# Patient Record
Sex: MALE | Race: WHITE | NOT HISPANIC OR LATINO | Employment: UNEMPLOYED | URBAN - METROPOLITAN AREA
[De-identification: names, ages, dates, MRNs, and addresses within clinical notes are randomized per-mention and may not be internally consistent; named-entity substitution may affect disease eponyms.]

---

## 2018-03-03 ENCOUNTER — HOSPITAL ENCOUNTER (EMERGENCY)
Facility: OTHER | Age: 33
Discharge: PSYCHIATRIC OR SUBSTANCE USE FACILITY | End: 2018-03-03
Attending: EMERGENCY MEDICINE
Payer: MEDICAID

## 2018-03-03 VITALS
DIASTOLIC BLOOD PRESSURE: 80 MMHG | WEIGHT: 170 LBS | SYSTOLIC BLOOD PRESSURE: 123 MMHG | OXYGEN SATURATION: 98 % | TEMPERATURE: 98 F | HEART RATE: 79 BPM | RESPIRATION RATE: 18 BRPM | BODY MASS INDEX: 25.76 KG/M2 | HEIGHT: 68 IN

## 2018-03-03 DIAGNOSIS — R45.851 SUICIDAL IDEATIONS: Primary | ICD-10-CM

## 2018-03-03 LAB
AMPHET+METHAMPHET UR QL: NEGATIVE
ANION GAP SERPL CALC-SCNC: 10 MMOL/L
APAP SERPL-MCNC: <3 UG/ML
BARBITURATES UR QL SCN>200 NG/ML: NEGATIVE
BASOPHILS # BLD AUTO: 0.01 K/UL
BASOPHILS NFR BLD: 0.2 %
BENZODIAZ UR QL SCN>200 NG/ML: NEGATIVE
BILIRUB UR QL STRIP: NEGATIVE
BUN SERPL-MCNC: 14 MG/DL
BZE UR QL SCN: NEGATIVE
CALCIUM SERPL-MCNC: 9.6 MG/DL
CANNABINOIDS UR QL SCN: NEGATIVE
CHLORIDE SERPL-SCNC: 101 MMOL/L
CLARITY UR: CLEAR
CO2 SERPL-SCNC: 28 MMOL/L
COLOR UR: YELLOW
CREAT SERPL-MCNC: 0.8 MG/DL
CREAT UR-MCNC: 132.4 MG/DL
DIFFERENTIAL METHOD: NORMAL
EOSINOPHIL # BLD AUTO: 0.2 K/UL
EOSINOPHIL NFR BLD: 3.5 %
ERYTHROCYTE [DISTWIDTH] IN BLOOD BY AUTOMATED COUNT: 13.5 %
EST. GFR  (AFRICAN AMERICAN): >60 ML/MIN/1.73 M^2
EST. GFR  (NON AFRICAN AMERICAN): >60 ML/MIN/1.73 M^2
ETHANOL SERPL-MCNC: <10 MG/DL
GLUCOSE SERPL-MCNC: 92 MG/DL
GLUCOSE UR QL STRIP: NEGATIVE
HCT VFR BLD AUTO: 44.6 %
HGB BLD-MCNC: 14.9 G/DL
HGB UR QL STRIP: ABNORMAL
KETONES UR QL STRIP: NEGATIVE
LEUKOCYTE ESTERASE UR QL STRIP: NEGATIVE
LYMPHOCYTES # BLD AUTO: 2 K/UL
LYMPHOCYTES NFR BLD: 32.8 %
MCH RBC QN AUTO: 30.3 PG
MCHC RBC AUTO-ENTMCNC: 33.4 G/DL
MCV RBC AUTO: 91 FL
METHADONE UR QL SCN>300 NG/ML: NEGATIVE
MONOCYTES # BLD AUTO: 0.5 K/UL
MONOCYTES NFR BLD: 7.8 %
NEUTROPHILS # BLD AUTO: 3.4 K/UL
NEUTROPHILS NFR BLD: 55.5 %
NITRITE UR QL STRIP: NEGATIVE
OPIATES UR QL SCN: NEGATIVE
PCP UR QL SCN>25 NG/ML: NEGATIVE
PH UR STRIP: 6 [PH] (ref 5–8)
PLATELET # BLD AUTO: 156 K/UL
PMV BLD AUTO: 10.6 FL
POTASSIUM SERPL-SCNC: 4.3 MMOL/L
PROT UR QL STRIP: NEGATIVE
RBC # BLD AUTO: 4.91 M/UL
SODIUM SERPL-SCNC: 139 MMOL/L
SP GR UR STRIP: 1.02 (ref 1–1.03)
TOXICOLOGY INFORMATION: NORMAL
URN SPEC COLLECT METH UR: ABNORMAL
UROBILINOGEN UR STRIP-ACNC: NEGATIVE EU/DL
WBC # BLD AUTO: 6.06 K/UL

## 2018-03-03 PROCEDURE — 80048 BASIC METABOLIC PNL TOTAL CA: CPT

## 2018-03-03 PROCEDURE — 80307 DRUG TEST PRSMV CHEM ANLYZR: CPT

## 2018-03-03 PROCEDURE — 81003 URINALYSIS AUTO W/O SCOPE: CPT

## 2018-03-03 PROCEDURE — 80320 DRUG SCREEN QUANTALCOHOLS: CPT

## 2018-03-03 PROCEDURE — 99285 EMERGENCY DEPT VISIT HI MDM: CPT

## 2018-03-03 PROCEDURE — S4991 NICOTINE PATCH NONLEGEND: HCPCS | Performed by: EMERGENCY MEDICINE

## 2018-03-03 PROCEDURE — 25000003 PHARM REV CODE 250: Performed by: EMERGENCY MEDICINE

## 2018-03-03 PROCEDURE — 85025 COMPLETE CBC W/AUTO DIFF WBC: CPT

## 2018-03-03 PROCEDURE — 80329 ANALGESICS NON-OPIOID 1 OR 2: CPT

## 2018-03-03 RX ORDER — LORAZEPAM 0.5 MG/1
0.5 TABLET ORAL EVERY 6 HOURS PRN
COMMUNITY
End: 2023-12-11

## 2018-03-03 RX ORDER — LORAZEPAM 1 MG/1
1 TABLET ORAL
Status: COMPLETED | OUTPATIENT
Start: 2018-03-03 | End: 2018-03-03

## 2018-03-03 RX ORDER — IBUPROFEN 200 MG
1 TABLET ORAL DAILY
Status: DISCONTINUED | OUTPATIENT
Start: 2018-03-03 | End: 2018-03-03 | Stop reason: HOSPADM

## 2018-03-03 RX ORDER — PHENYTOIN SODIUM 200 MG/1
200 CAPSULE, EXTENDED RELEASE ORAL 3 TIMES DAILY
COMMUNITY
End: 2024-01-30

## 2018-03-03 RX ADMIN — NICOTINE 1 PATCH: 21 PATCH, EXTENDED RELEASE TRANSDERMAL at 08:03

## 2018-03-03 RX ADMIN — LORAZEPAM 1 MG: 1 TABLET ORAL at 08:03

## 2018-03-03 NOTE — ED TRIAGE NOTES
Pt states he has been feeling depressed lately and last night he started with SI thoughts and wanting to take a bunch of pills. Pt reports prior suicide attempt 4 years ago. Denies any drug use or ETOH. Pt reports compliance with prescribed medications but has not taken any of his daily medications today.

## 2018-03-03 NOTE — ED NOTES
Rounding on the pt performed. art Foy, at bedside with direct observation. Pt calm and cooperative. No c/o pain/discomfort at this time. Safe environment maintained. Bed in low, locked position. Monitoring continues. VSS
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
## 2018-03-03 NOTE — ED NOTES
Pt given blue disposable scrubs to change in to and all personal belongings removed. Nonessential equipment removed from room. Bed in low, locked position. Urinal provided for sample. Danii Julien, at bedside. Direct observation initiated and maintained.

## 2018-03-03 NOTE — ED NOTES
Pt  Talking To dr rosen ate lunch . [place] in view of pt but unable to hear discussion with dr rosen.

## 2018-03-03 NOTE — ED NOTES
Pt belonging  Shoes x 2 , socks x 3 pairs, pants blue  X 2 , 2 bottles body wash, shirts x 3 , tooth brush ,paste hair brush, lighter , hair spray, phone and , lotion, vap device, holter monitor. Insurance card, debt card , [place] [employer] card  , razor , folder  With papers. Security assisted and took  Belongs to lock up.
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
## 2020-09-19 ENCOUNTER — HISTORICAL (OUTPATIENT)
Dept: ADMINISTRATIVE | Facility: HOSPITAL | Age: 35
End: 2020-09-19

## 2020-11-06 ENCOUNTER — HISTORICAL (OUTPATIENT)
Dept: ADMINISTRATIVE | Facility: HOSPITAL | Age: 35
End: 2020-11-06

## 2020-11-09 LAB — FINAL CULTURE: NORMAL

## 2022-12-16 ENCOUNTER — HOSPITAL ENCOUNTER (EMERGENCY)
Facility: HOSPITAL | Age: 37
Discharge: HOME | End: 2022-12-16
Attending: EMERGENCY MEDICINE
Payer: MEDICAID

## 2022-12-16 VITALS
SYSTOLIC BLOOD PRESSURE: 135 MMHG | OXYGEN SATURATION: 98 % | WEIGHT: 155 LBS | DIASTOLIC BLOOD PRESSURE: 85 MMHG | HEART RATE: 101 BPM | TEMPERATURE: 99 F | HEIGHT: 68 IN | BODY MASS INDEX: 23.49 KG/M2 | RESPIRATION RATE: 18 BRPM

## 2022-12-16 DIAGNOSIS — K12.0 APHTHOUS ULCER OF MOUTH: ICD-10-CM

## 2022-12-16 DIAGNOSIS — F43.0 ANXIETY IN ACUTE STRESS REACTION: Primary | ICD-10-CM

## 2022-12-16 DIAGNOSIS — F41.1 ANXIETY IN ACUTE STRESS REACTION: Primary | ICD-10-CM

## 2022-12-16 PROCEDURE — 99283 EMERGENCY DEPT VISIT LOW MDM: CPT

## 2022-12-16 RX ORDER — ALPRAZOLAM 1 MG/1
1 TABLET ORAL 2 TIMES DAILY
Qty: 10 TABLET | Refills: 0 | Status: SHIPPED | OUTPATIENT
Start: 2022-12-16 | End: 2023-12-11

## 2022-12-16 NOTE — ED PROVIDER NOTES
Encounter Date: 12/16/2022       History     Chief Complaint   Patient presents with    Anxiety     Depression and anxiety   started 2 days ago   denies SI HI       37 y.o. male presents to the ED with worsening anxiety and depression over the last several days.  Patient states his partner was diagnosed with colorectal cancer 2 years ago however notes that they recently received some very bad news regarding his condition.  Patient states he is a history of anxiety however stopped taking his Xanax approximately 6 months ago.  States that he is not wanting to eat and drink due to this new news.  Denies auditory visual hallucinations, SI or HI.  States he tried calling his PCP however is not able to see him for another week.      The history is provided by the patient. No  was used.   Anxiety  This is a recurrent problem. The current episode started more than 2 days ago. The problem occurs constantly. The problem has not changed since onset.Pertinent negatives include no chest pain, no abdominal pain, no headaches and no shortness of breath. Nothing aggravates the symptoms. Nothing relieves the symptoms. He has tried nothing for the symptoms.   Review of patient's allergies indicates:   Allergen Reactions    Desyrel [trazodone] Shortness Of Breath    Seroquel [quetiapine] Shortness Of Breath     Past Medical History:   Diagnosis Date    ADHD (attention deficit hyperactivity disorder)     Anxiety     Depression     Fatigue     History of psychiatric care     History of psychiatric hospitalization     about a year ago in [place]    Obsessive-compulsive disorder     Psychiatric exam requested by authority     Psychiatric problem     Psychosis     PTSD (post-traumatic stress disorder)     Seizures     Suicide attempt     2008 by od    Therapy     [facility]     No past surgical history on file.  Family History   Problem Relation Age of Onset    Anxiety disorder Mother     Anxiety disorder  Maternal Aunt      Social History     Tobacco Use    Smoking status: Every Day     Packs/day: 1.50     Years: 10.00     Pack years: 15.00     Types: Cigarettes    Smokeless tobacco: Never   Substance Use Topics    Alcohol use: No    Drug use: No     Review of Systems   Constitutional:  Negative for fever.   HENT:  Positive for mouth sores. Negative for sore throat.    Respiratory:  Negative for shortness of breath.    Cardiovascular:  Negative for chest pain.   Gastrointestinal:  Negative for abdominal pain and nausea.   Genitourinary:  Negative for dysuria.   Musculoskeletal:  Negative for back pain.   Skin:  Negative for rash.   Neurological:  Negative for weakness and headaches.   Hematological:  Does not bruise/bleed easily.   Psychiatric/Behavioral:  Negative for agitation, hallucinations and suicidal ideas. The patient is nervous/anxious.    All other systems reviewed and are negative.    Physical Exam     Initial Vitals [12/16/22 1446]   BP Pulse Resp Temp SpO2   (!) 142/90 110 20 98.6 °F (37 °C) 98 %      MAP       --         Physical Exam    Nursing note and vitals reviewed.  Constitutional: He appears well-developed and well-nourished.   HENT:   Head: Normocephalic and atraumatic.   Mouth/Throat: Uvula is midline, oropharynx is clear and moist and mucous membranes are normal. Oral lesions (consistent with aphthous ulcers) present.   Eyes: EOM are normal. Pupils are equal, round, and reactive to light.   Neck: Neck supple.   Normal range of motion.  Cardiovascular:  Normal rate, regular rhythm, normal heart sounds and intact distal pulses.           Pulmonary/Chest: Breath sounds normal.   Musculoskeletal:         General: Normal range of motion.      Cervical back: Normal range of motion and neck supple.     Neurological: He is alert and oriented to person, place, and time. He has normal strength. GCS score is 15. GCS eye subscore is 4. GCS verbal subscore is 5. GCS motor subscore is 6.   Skin: Skin is  warm and dry.   Psychiatric: His speech is normal and behavior is normal. Judgment and thought content normal. His mood appears anxious. He is not actively hallucinating. Cognition and memory are normal. He exhibits a depressed mood. He expresses no homicidal and no suicidal ideation. He expresses no suicidal plans and no homicidal plans.       ED Course   Procedures  Labs Reviewed - No data to display       Imaging Results    None          Medications - No data to display  Medical Decision Making:   Differential Diagnosis:   Anxiety, depression, panic attack, aphthous ulcer  ED Management:  Patient with previous history of anxiety, on Xanax, however states he took himself off approximately 6 months ago.  States that his partners condition has begun to worsen as of late and believes this is causing spikes in his anxiety.  Patient states he is on Paxil for OCD notes that his anxiety is getting the best of him.  States he is also depressed however denies any SI, HI, AH or VH.  Patient states he just needs a little help.  States he tried getting into see his PCP however is not able to until next week.  Will send home with short course of Xanax to take as needed for symptom relief.  So gave him instructions to buy over-the-counter anesthetic for canker sores.                        Clinical Impression:   Final diagnoses:  [F41.1, F43.0] Anxiety in acute stress reaction (Primary)  [K12.0] Aphthous ulcer of mouth        ED Disposition Condition    Discharge Stable          ED Prescriptions       Medication Sig Dispense Start Date End Date Auth. Provider    ALPRAZolam (XANAX) 1 MG tablet Take 1 tablet (1 mg total) by mouth 2 (two) times daily. for 5 days 10 tablet 12/16/2022 12/21/2022 Lobito Jon PA-C          Follow-up Information       Follow up With Specialties Details Why Contact Info    [facility] - Emergency Dept Emergency Medicine In 1 week If symptoms worsen [id number]  [place] [address]  [phone]    Primary care provider  In 2 days As needed              Lobito Jon PA-C  12/16/22 5222

## 2023-10-02 DIAGNOSIS — B20 HUMAN IMMUNODEFICIENCY VIRUS (HIV) DISEASE: Primary | ICD-10-CM

## 2023-10-13 ENCOUNTER — TELEPHONE (OUTPATIENT)
Dept: INFECTIOUS DISEASES | Facility: CLINIC | Age: 38
End: 2023-10-13
Payer: MEDICAID

## 2023-10-13 NOTE — TELEPHONE ENCOUNTER
Not sure what call he is referring to. I spoke with him in entirety yesterday. He has an appointment today with me that I scheduled yesterday, which as of now he is a no show.

## 2023-10-13 NOTE — TELEPHONE ENCOUNTER
----- Message from Elyse Chapa sent at 10/13/2023 10:34 AM CDT -----  Nasrin Hua left vm returning your call     [phone]

## 2023-10-27 ENCOUNTER — CLINICAL SUPPORT (OUTPATIENT)
Dept: INFECTIOUS DISEASES | Facility: CLINIC | Age: 38
End: 2023-10-27
Payer: MEDICAID

## 2023-10-27 ENCOUNTER — TELEPHONE (OUTPATIENT)
Dept: INFECTIOUS DISEASES | Facility: CLINIC | Age: 38
End: 2023-10-27
Payer: MEDICAID

## 2023-10-27 VITALS — BODY MASS INDEX: 19.45 KG/M2 | WEIGHT: 127.88 LBS

## 2023-10-27 DIAGNOSIS — B20 HUMAN IMMUNODEFICIENCY VIRUS (HIV) DISEASE: Primary | ICD-10-CM

## 2023-10-27 LAB
ALBUMIN SERPL-MCNC: 4.3 G/DL (ref 3.5–5)
ALBUMIN/GLOB SERPL: 1.3 RATIO (ref 1.1–2)
ALP SERPL-CCNC: 60 UNIT/L (ref 40–150)
ALT SERPL-CCNC: 22 UNIT/L (ref 0–55)
AST SERPL-CCNC: 19 UNIT/L (ref 5–34)
BASOPHILS # BLD AUTO: 0.03 X10(3)/MCL
BASOPHILS NFR BLD AUTO: 0.6 %
BILIRUB SERPL-MCNC: 0.2 MG/DL
BUN SERPL-MCNC: 13.2 MG/DL (ref 8.9–20.6)
CALCIUM SERPL-MCNC: 9.9 MG/DL (ref 8.4–10.2)
CHLORIDE SERPL-SCNC: 104 MMOL/L (ref 98–107)
CHOLEST SERPL-MCNC: 129 MG/DL
CHOLEST/HDLC SERPL: 4 {RATIO} (ref 0–5)
CO2 SERPL-SCNC: 28 MMOL/L (ref 22–29)
CREAT SERPL-MCNC: 0.86 MG/DL (ref 0.73–1.18)
DEPRECATED CALCIDIOL+CALCIFEROL SERPL-MC: 27.6 NG/ML (ref 30–80)
EOSINOPHIL # BLD AUTO: 0.35 X10(3)/MCL (ref 0–0.9)
EOSINOPHIL NFR BLD AUTO: 6.9 %
ERYTHROCYTE [DISTWIDTH] IN BLOOD BY AUTOMATED COUNT: 13.6 % (ref 11.5–17)
EST. AVERAGE GLUCOSE BLD GHB EST-MCNC: 96.8 MG/DL
GFR SERPLBLD CREATININE-BSD FMLA CKD-EPI: >60 MLS/MIN/1.73/M2
GLOBULIN SER-MCNC: 3.2 GM/DL (ref 2.4–3.5)
GLUCOSE SERPL-MCNC: 61 MG/DL (ref 74–100)
HAV AB SER QL IA: REACTIVE
HBA1C MFR BLD: 5 %
HBV CORE IGM SERPL QL IA: NONREACTIVE
HBV SURFACE AB SER-ACNC: 150.69 MIU/ML
HBV SURFACE AB SERPL IA-ACNC: REACTIVE M[IU]/ML
HCT VFR BLD AUTO: 45.4 % (ref 42–52)
HCV AB SERPL QL IA: NONREACTIVE
HDLC SERPL-MCNC: 34 MG/DL (ref 35–60)
HGB BLD-MCNC: 14.8 G/DL (ref 14–18)
IMM GRANULOCYTES # BLD AUTO: 0.01 X10(3)/MCL (ref 0–0.04)
IMM GRANULOCYTES NFR BLD AUTO: 0.2 %
LDLC SERPL CALC-MCNC: 55 MG/DL (ref 50–140)
LYMPHOCYTES # BLD AUTO: 1.92 X10(3)/MCL (ref 0.6–4.6)
LYMPHOCYTES NFR BLD AUTO: 37.7 %
MCH RBC QN AUTO: 28 PG (ref 27–31)
MCHC RBC AUTO-ENTMCNC: 32.6 G/DL (ref 33–36)
MCV RBC AUTO: 86 FL (ref 80–94)
MONOCYTES # BLD AUTO: 0.38 X10(3)/MCL (ref 0.1–1.3)
MONOCYTES NFR BLD AUTO: 7.5 %
NEUTROPHILS # BLD AUTO: 2.4 X10(3)/MCL (ref 2.1–9.2)
NEUTROPHILS NFR BLD AUTO: 47.1 %
NRBC BLD AUTO-RTO: 0 %
PLATELET # BLD AUTO: 181 X10(3)/MCL (ref 130–400)
PMV BLD AUTO: 10.4 FL (ref 7.4–10.4)
POTASSIUM SERPL-SCNC: 4 MMOL/L (ref 3.5–5.1)
PROT SERPL-MCNC: 7.5 GM/DL (ref 6.4–8.3)
PSA SERPL-MCNC: 0.57 NG/ML
RBC # BLD AUTO: 5.28 X10(6)/MCL (ref 4.7–6.1)
SODIUM SERPL-SCNC: 140 MMOL/L (ref 136–145)
T PALLIDUM AB SER QL: NONREACTIVE
TRIGL SERPL-MCNC: 200 MG/DL (ref 34–140)
TSH SERPL-ACNC: 1.07 UIU/ML (ref 0.35–4.94)
VLDLC SERPL CALC-MCNC: 40 MG/DL
WBC # SPEC AUTO: 5.09 X10(3)/MCL (ref 4.5–11.5)

## 2023-10-27 PROCEDURE — 86780 TREPONEMA PALLIDUM: CPT

## 2023-10-27 PROCEDURE — 82306 VITAMIN D 25 HYDROXY: CPT

## 2023-10-27 PROCEDURE — 86706 HEP B SURFACE ANTIBODY: CPT

## 2023-10-27 PROCEDURE — 86480 TB TEST CELL IMMUN MEASURE: CPT

## 2023-10-27 PROCEDURE — 84153 ASSAY OF PSA TOTAL: CPT

## 2023-10-27 PROCEDURE — 86803 HEPATITIS C AB TEST: CPT

## 2023-10-27 PROCEDURE — 80061 LIPID PANEL: CPT

## 2023-10-27 PROCEDURE — 87536 HIV-1 QUANT&REVRSE TRNSCRPJ: CPT

## 2023-10-27 PROCEDURE — 0219U NFCT AGT HIV GNRJ SEQ ALYS: CPT

## 2023-10-27 PROCEDURE — 84443 ASSAY THYROID STIM HORMONE: CPT

## 2023-10-27 PROCEDURE — 80053 COMPREHEN METABOLIC PANEL: CPT

## 2023-10-27 PROCEDURE — 86709 HEPATITIS A IGM ANTIBODY: CPT

## 2023-10-27 PROCEDURE — 85025 COMPLETE CBC W/AUTO DIFF WBC: CPT

## 2023-10-27 PROCEDURE — 86708 HEPATITIS A ANTIBODY: CPT

## 2023-10-27 PROCEDURE — 81381 HLA I TYPING 1 ALLELE HR: CPT

## 2023-10-27 PROCEDURE — 83036 HEMOGLOBIN GLYCOSYLATED A1C: CPT

## 2023-10-27 PROCEDURE — 36415 COLL VENOUS BLD VENIPUNCTURE: CPT
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
## 2023-10-31 LAB
ADVERSE DRUG SEQ VAR INTERP BLD/T-IMP: NORMAL
GAMMA INTERFERON BACKGROUND BLD IA-ACNC: 0.05 IU/ML
GENETICIST REVIEW: NORMAL
HLA-B*57:01 QL: NEGATIVE
LAB TEST METHOD: NORMAL
M TB IFN-G BLD-IMP: NEGATIVE
M TB IFN-G CD4+ BCKGRND COR BLD-ACNC: -0.01 IU/ML
M TB IFN-G CD4+CD8+ BCKGRND COR BLD-ACNC: -0.01 IU/ML
MITOGEN IGNF BCKGRD COR BLD-ACNC: 9.46 IU/ML
PROVIDER SIGNING NAME: NORMAL
TEST PERFORMANCE INFO SPEC: NORMAL

## 2023-11-01 LAB — HIV1 RNA # PLAS NAA DL=20: ABNORMAL COPIES/ML

## 2023-11-02 LAB
ABACAVIR ISLT GENOTYP: NORMAL
ATAZANAVIR+RITONAVIR ISLT GENOTYP: NORMAL
BICTEGRAVIR ISLT GENOTYP: NORMAL
CABOTEGRAVIR ISLT GENOTYP: NORMAL
DARUNAVIR+RITONAVIR ISLT GENOTYP: NORMAL
DIDANOSINE ISLT GENOTYP: NORMAL
DOLUTEGRAVIR ISLT GENOTYP: NORMAL
DORAVIRINE ISLT GENOTYP: NORMAL
EFAVIRENZ ISLT GENOTYP: NORMAL
ELVITEGRAVIR ISLT GENOTYP: NORMAL
EMTRICITABINE ISLT GENOTYP: NORMAL
ETRAVIRINE ISLT GENOTYP: NORMAL
FOSAMPRENAVIR+RITONAVIR ISLT GENOTYP: NORMAL
HIV 1 RNA GENOTYPE ISLT: NORMAL
HIV 1 RNA RT + PR + IN MUT DET PLAS SEQ: NORMAL
HIV NNRTI GENE MUT DET ISLT: NORMAL
HIV NRTI GENE MUT DET ISLT: NORMAL
HIV PI GENE MUT DET ISLT: NORMAL
HIV1 INTEGRASE GENE MUT DET ISLT: NORMAL
INDINAVIR+RITONAVIR ISLT GENOTYP: NORMAL
LAB AOE PNL PATIENT: NORMAL
LAMIVUDINE ISLT GENOTYP: NORMAL
LOPINAVIR+RITONAVIR ISLT GENOTYP: NORMAL
M INTEGRASE FAILED CODONS: NORMAL
M PROTEASE FAILED CODONS: NORMAL
M REVERSE TRANSCRIPTASE FAILED CODONS: NORMAL
NELFINAVIR ISLT GENOTYP: NORMAL
NEVIRAPINE ISLT GENOTYP: NORMAL
RALTEGRAVIR ISLT GENOTYP: NORMAL
RILPIVIRINE ISLT GENOTYP: NORMAL
SAQUINAVIR+RITONAVIR ISLT GENOTYP: NORMAL
STAVUDINE ISLT GENOTYP: NORMAL
TENOFOVIR ISLT GENOTYP: NORMAL
TIPRANAVIR+RITONAVIR ISLT GENOTYP: NORMAL
ZIDOVUDINE ISLT GENOTYP: NORMAL

## 2023-11-16 ENCOUNTER — TELEPHONE (OUTPATIENT)
Dept: INFECTIOUS DISEASES | Facility: CLINIC | Age: 38
End: 2023-11-16
Payer: MEDICAID

## 2023-11-16 NOTE — TELEPHONE ENCOUNTER
----- Message from Angelina Edgar sent at 11/16/2023  2:48 PM CST -----  Regarding: Results  Tammy medrano pt       Pt is requesting a call for viral load results.   Please call @  [phone]

## 2023-12-11 ENCOUNTER — HOSPITAL ENCOUNTER (OUTPATIENT)
Dept: RADIOLOGY | Facility: HOSPITAL | Age: 38
Discharge: HOME | End: 2023-12-11
Attending: NURSE PRACTITIONER
Payer: MEDICAID

## 2023-12-11 ENCOUNTER — OFFICE VISIT (OUTPATIENT)
Dept: INFECTIOUS DISEASES | Facility: CLINIC | Age: 38
End: 2023-12-11
Payer: MEDICAID

## 2023-12-11 VITALS
DIASTOLIC BLOOD PRESSURE: 86 MMHG | RESPIRATION RATE: 16 BRPM | WEIGHT: 129.13 LBS | HEART RATE: 102 BPM | BODY MASS INDEX: 19.57 KG/M2 | TEMPERATURE: 98 F | HEIGHT: 68 IN | SYSTOLIC BLOOD PRESSURE: 135 MMHG

## 2023-12-11 DIAGNOSIS — F32.A DEPRESSION, UNSPECIFIED DEPRESSION TYPE: ICD-10-CM

## 2023-12-11 DIAGNOSIS — B20 HIV DISEASE: Primary | ICD-10-CM

## 2023-12-11 DIAGNOSIS — G40.909 NONINTRACTABLE EPILEPSY WITHOUT STATUS EPILEPTICUS, UNSPECIFIED EPILEPSY TYPE: ICD-10-CM

## 2023-12-11 DIAGNOSIS — B20 HIV DISEASE: ICD-10-CM

## 2023-12-11 DIAGNOSIS — F41.1 GAD (GENERALIZED ANXIETY DISORDER): ICD-10-CM

## 2023-12-11 DIAGNOSIS — F17.210 CIGARETTE NICOTINE DEPENDENCE WITHOUT COMPLICATION: ICD-10-CM

## 2023-12-11 LAB
CRYPTOC AG SER QL IA.RAPID: NEGATIVE
CRYPTOC AG TITR CSF IA: NORMAL {TITER}

## 2023-12-11 PROCEDURE — 86644 CMV ANTIBODY: CPT | Performed by: NURSE PRACTITIONER

## 2023-12-11 PROCEDURE — 99215 OFFICE O/P EST HI 40 MIN: CPT | Mod: PBBFAC,25 | Performed by: NURSE PRACTITIONER

## 2023-12-11 PROCEDURE — 3044F HG A1C LEVEL LT 7.0%: CPT | Mod: CPTII,,, | Performed by: NURSE PRACTITIONER

## 2023-12-11 PROCEDURE — 99205 OFFICE O/P NEW HI 60 MIN: CPT | Mod: 25,S$PBB,, | Performed by: NURSE PRACTITIONER

## 2023-12-11 PROCEDURE — 3079F PR MOST RECENT DIASTOLIC BLOOD PRESSURE 80-89 MM HG: ICD-10-PCS | Mod: CPTII,,, | Performed by: NURSE PRACTITIONER

## 2023-12-11 PROCEDURE — 71046 X-RAY EXAM CHEST 2 VIEWS: CPT | Mod: TC

## 2023-12-11 PROCEDURE — 36415 COLL VENOUS BLD VENIPUNCTURE: CPT | Performed by: NURSE PRACTITIONER

## 2023-12-11 PROCEDURE — 3008F BODY MASS INDEX DOCD: CPT | Mod: CPTII,,, | Performed by: NURSE PRACTITIONER

## 2023-12-11 PROCEDURE — 1160F PR REVIEW ALL MEDS BY PRESCRIBER/CLIN PHARMACIST DOCUMENTED: ICD-10-PCS | Mod: CPTII,,, | Performed by: NURSE PRACTITIONER

## 2023-12-11 PROCEDURE — 99406 PR TOBACCO USE CESSATION INTERMEDIATE 3-10 MINUTES: ICD-10-PCS | Mod: S$PBB,,, | Performed by: NURSE PRACTITIONER

## 2023-12-11 PROCEDURE — 1159F PR MEDICATION LIST DOCUMENTED IN MEDICAL RECORD: ICD-10-PCS | Mod: CPTII,,, | Performed by: NURSE PRACTITIONER

## 2023-12-11 PROCEDURE — 3044F PR MOST RECENT HEMOGLOBIN A1C LEVEL <7.0%: ICD-10-PCS | Mod: CPTII,,, | Performed by: NURSE PRACTITIONER

## 2023-12-11 PROCEDURE — 86361 T CELL ABSOLUTE COUNT: CPT | Performed by: NURSE PRACTITIONER

## 2023-12-11 PROCEDURE — 3075F PR MOST RECENT SYSTOLIC BLOOD PRESS GE 130-139MM HG: ICD-10-PCS | Mod: CPTII,,, | Performed by: NURSE PRACTITIONER

## 2023-12-11 PROCEDURE — 86778 TOXOPLASMA ANTIBODY IGM: CPT | Performed by: NURSE PRACTITIONER

## 2023-12-11 PROCEDURE — 87899 AGENT NOS ASSAY W/OPTIC: CPT | Performed by: NURSE PRACTITIONER

## 2023-12-11 PROCEDURE — 3075F SYST BP GE 130 - 139MM HG: CPT | Mod: CPTII,,, | Performed by: NURSE PRACTITIONER

## 2023-12-11 PROCEDURE — 1160F RVW MEDS BY RX/DR IN RCRD: CPT | Mod: CPTII,,, | Performed by: NURSE PRACTITIONER

## 2023-12-11 PROCEDURE — 99205 PR OFFICE/OUTPT VISIT, NEW, LEVL V, 60-74 MIN: ICD-10-PCS | Mod: 25,S$PBB,, | Performed by: NURSE PRACTITIONER

## 2023-12-11 PROCEDURE — 3079F DIAST BP 80-89 MM HG: CPT | Mod: CPTII,,, | Performed by: NURSE PRACTITIONER

## 2023-12-11 PROCEDURE — 3008F PR BODY MASS INDEX (BMI) DOCUMENTED: ICD-10-PCS | Mod: CPTII,,, | Performed by: NURSE PRACTITIONER

## 2023-12-11 PROCEDURE — 1159F MED LIST DOCD IN RCRD: CPT | Mod: CPTII,,, | Performed by: NURSE PRACTITIONER

## 2023-12-11 PROCEDURE — 86777 TOXOPLASMA ANTIBODY: CPT | Performed by: NURSE PRACTITIONER

## 2023-12-11 PROCEDURE — 99406 BEHAV CHNG SMOKING 3-10 MIN: CPT | Mod: S$PBB,,, | Performed by: NURSE PRACTITIONER

## 2023-12-11 RX ORDER — CYPROHEPTADINE HYDROCHLORIDE 4 MG/1
TABLET ORAL 2 TIMES DAILY
COMMUNITY

## 2023-12-11 RX ORDER — IBUPROFEN 800 MG/1
TABLET ORAL
COMMUNITY
Start: 2023-10-23

## 2023-12-11 RX ORDER — PHENYTOIN SODIUM 200 MG/1
200 CAPSULE, EXTENDED RELEASE ORAL 2 TIMES DAILY
COMMUNITY
End: 2024-01-30

## 2023-12-11 RX ORDER — PROPRANOLOL HYDROCHLORIDE 40 MG/1
TABLET ORAL
COMMUNITY
End: 2024-03-27

## 2023-12-11 RX ORDER — ARIPIPRAZOLE 20 MG/1
20 TABLET ORAL
COMMUNITY
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
## 2023-12-12 LAB
CMV IGG SERPL QL IA: POSITIVE
T GONDII IGG SER QL IA: NEGATIVE
T GONDII IGG SER-ACNC: <3 IU/ML
T GONDII IGM SERPL QL IA: NEGATIVE

## 2023-12-13 LAB
AGE: 36
CD3+CD4+ CELLS # SPEC: 636 UNIT/L (ref 589–1505)
CD3+CD4+ CELLS NFR BLD: 50 %
LYMPHOCYTES # BLD AUTO: 1272 X10(3)/MCL (ref 1260–5520)
LYMPHOCYTES NFR LN MANUAL: 16 % (ref 28–48)
LYMPHOMA - T-CELL MARKERS SPEC-IMP: ABNORMAL
WBC # BLD AUTO: 7950 /MM3 (ref 4500–11500)

## 2024-01-30 PROBLEM — F17.200 SMOKER: Status: ACTIVE | Noted: 2018-03-28

## 2024-02-21 ENCOUNTER — DOCUMENTATION ONLY (OUTPATIENT)
Dept: INFECTIOUS DISEASES | Facility: CLINIC | Age: 39
End: 2024-02-21
Payer: MEDICAID

## 2024-03-27 ENCOUNTER — HOSPITAL ENCOUNTER (EMERGENCY)
Facility: HOSPITAL | Age: 39
Discharge: HOME | End: 2024-03-27
Attending: EMERGENCY MEDICINE
Payer: MEDICAID

## 2024-03-27 VITALS
OXYGEN SATURATION: 100 % | HEIGHT: 68 IN | RESPIRATION RATE: 18 BRPM | WEIGHT: 140 LBS | SYSTOLIC BLOOD PRESSURE: 135 MMHG | DIASTOLIC BLOOD PRESSURE: 86 MMHG | HEART RATE: 86 BPM | BODY MASS INDEX: 21.22 KG/M2 | TEMPERATURE: 98 F

## 2024-03-27 DIAGNOSIS — F32.A DEPRESSION, UNSPECIFIED DEPRESSION TYPE: Primary | ICD-10-CM

## 2024-03-27 LAB
ALBUMIN SERPL-MCNC: 4.3 G/DL (ref 3.5–5)
ALBUMIN/GLOB SERPL: 1.4 RATIO (ref 1.1–2)
ALP SERPL-CCNC: 64 UNIT/L (ref 40–150)
ALT SERPL-CCNC: 16 UNIT/L (ref 0–55)
APPEARANCE UR: CLEAR
AST SERPL-CCNC: 20 UNIT/L (ref 5–34)
BACTERIA #/AREA URNS AUTO: ABNORMAL /HPF
BASOPHILS # BLD AUTO: 0.02 X10(3)/MCL
BASOPHILS NFR BLD AUTO: 0.2 %
BILIRUB SERPL-MCNC: 0.2 MG/DL
BILIRUB UR QL STRIP.AUTO: NEGATIVE
BUN SERPL-MCNC: 13.4 MG/DL (ref 8.9–20.6)
CALCIUM SERPL-MCNC: 9.6 MG/DL (ref 8.4–10.2)
CARBAMAZEPINE FREE SERPL-MCNC: <1.9 UG/ML (ref 4–12)
CHLORIDE SERPL-SCNC: 106 MMOL/L (ref 98–107)
CO2 SERPL-SCNC: 23 MMOL/L (ref 22–29)
COLOR UR AUTO: ABNORMAL
CREAT SERPL-MCNC: 0.85 MG/DL (ref 0.73–1.18)
EOSINOPHIL # BLD AUTO: 0.28 X10(3)/MCL (ref 0–0.9)
EOSINOPHIL NFR BLD AUTO: 3.3 %
ERYTHROCYTE [DISTWIDTH] IN BLOOD BY AUTOMATED COUNT: 13 % (ref 11.5–17)
ETHANOL SERPL-MCNC: <10 MG/DL
GFR SERPLBLD CREATININE-BSD FMLA CKD-EPI: >60 MLS/MIN/1.73/M2
GLOBULIN SER-MCNC: 3.1 GM/DL (ref 2.4–3.5)
GLUCOSE SERPL-MCNC: 94 MG/DL (ref 74–100)
GLUCOSE UR QL STRIP.AUTO: NORMAL
HCT VFR BLD AUTO: 41.1 % (ref 42–52)
HGB BLD-MCNC: 13.7 G/DL (ref 14–18)
IMM GRANULOCYTES # BLD AUTO: 0.01 X10(3)/MCL (ref 0–0.04)
IMM GRANULOCYTES NFR BLD AUTO: 0.1 %
KETONES UR QL STRIP.AUTO: NEGATIVE
LEUKOCYTE ESTERASE UR QL STRIP.AUTO: NEGATIVE
LYMPHOCYTES # BLD AUTO: 1.39 X10(3)/MCL (ref 0.6–4.6)
LYMPHOCYTES NFR BLD AUTO: 16.2 %
MCH RBC QN AUTO: 27.8 PG (ref 27–31)
MCHC RBC AUTO-ENTMCNC: 33.3 G/DL (ref 33–36)
MCV RBC AUTO: 83.5 FL (ref 80–94)
MONOCYTES # BLD AUTO: 0.49 X10(3)/MCL (ref 0.1–1.3)
MONOCYTES NFR BLD AUTO: 5.7 %
MUCOUS THREADS URNS QL MICRO: ABNORMAL /LPF
NEUTROPHILS # BLD AUTO: 6.4 X10(3)/MCL (ref 2.1–9.2)
NEUTROPHILS NFR BLD AUTO: 74.5 %
NITRITE UR QL STRIP.AUTO: NEGATIVE
NRBC BLD AUTO-RTO: 0 %
PH UR STRIP.AUTO: 6 [PH]
PHENYTOIN SERPL-MCNC: <1.8 UG/ML (ref 10–20)
PLATELET # BLD AUTO: 172 X10(3)/MCL (ref 130–400)
PMV BLD AUTO: 9.9 FL (ref 7.4–10.4)
POTASSIUM SERPL-SCNC: 4.1 MMOL/L (ref 3.5–5.1)
PROT SERPL-MCNC: 7.4 GM/DL (ref 6.4–8.3)
PROT UR QL STRIP.AUTO: NEGATIVE
RBC # BLD AUTO: 4.92 X10(6)/MCL (ref 4.7–6.1)
RBC #/AREA URNS AUTO: ABNORMAL /HPF
RBC UR QL AUTO: ABNORMAL
SODIUM SERPL-SCNC: 138 MMOL/L (ref 136–145)
SP GR UR STRIP.AUTO: 1.02 (ref 1–1.03)
SQUAMOUS #/AREA URNS LPF: ABNORMAL /HPF
TSH SERPL-ACNC: 0.66 UIU/ML (ref 0.35–4.94)
UROBILINOGEN UR STRIP-ACNC: NORMAL
WBC # SPEC AUTO: 8.59 X10(3)/MCL (ref 4.5–11.5)
WBC #/AREA URNS AUTO: ABNORMAL /HPF

## 2024-03-27 PROCEDURE — 82077 ASSAY SPEC XCP UR&BREATH IA: CPT | Performed by: EMERGENCY MEDICINE

## 2024-03-27 PROCEDURE — 80161 ASY CARBAMAZEPIN 10,11-EPXID: CPT | Performed by: EMERGENCY MEDICINE

## 2024-03-27 PROCEDURE — 80185 ASSAY OF PHENYTOIN TOTAL: CPT | Performed by: EMERGENCY MEDICINE

## 2024-03-27 PROCEDURE — 81001 URINALYSIS AUTO W/SCOPE: CPT | Mod: XB | Performed by: EMERGENCY MEDICINE

## 2024-03-27 PROCEDURE — 99284 EMERGENCY DEPT VISIT MOD MDM: CPT

## 2024-03-27 PROCEDURE — 80053 COMPREHEN METABOLIC PANEL: CPT | Performed by: EMERGENCY MEDICINE

## 2024-03-27 PROCEDURE — 84443 ASSAY THYROID STIM HORMONE: CPT | Performed by: EMERGENCY MEDICINE

## 2024-03-27 PROCEDURE — 80307 DRUG TEST PRSMV CHEM ANLYZR: CPT | Performed by: EMERGENCY MEDICINE

## 2024-03-27 PROCEDURE — 85025 COMPLETE CBC W/AUTO DIFF WBC: CPT | Performed by: EMERGENCY MEDICINE

## 2024-03-27 RX ORDER — PAROXETINE HYDROCHLORIDE 20 MG/1
20 TABLET, FILM COATED ORAL NIGHTLY
Qty: 30 TABLET | Refills: 0 | Status: SHIPPED | OUTPATIENT
Start: 2024-03-27 | End: 2025-03-27

## 2024-03-27 RX ORDER — PAROXETINE 30 MG/1
30 TABLET, FILM COATED ORAL DAILY
Qty: 30 TABLET | Refills: 0 | Status: SHIPPED | OUTPATIENT
Start: 2024-03-27 | End: 2024-04-26

## 2024-03-27 RX ORDER — LEVETIRACETAM 500 MG/1
500 TABLET ORAL DAILY
Qty: 30 TABLET | Refills: 0 | Status: SHIPPED | OUTPATIENT
Start: 2024-03-27 | End: 2025-03-27

## 2024-03-27 RX ORDER — PROPRANOLOL HYDROCHLORIDE 40 MG/1
40 TABLET ORAL 2 TIMES DAILY
Qty: 60 TABLET | Refills: 0 | Status: SHIPPED | OUTPATIENT
Start: 2024-03-27 | End: 2024-05-31

## 2024-03-28 LAB
AMPHET UR QL SCN: POSITIVE
BARBITURATE SCN PRESENT UR: NEGATIVE
BENZODIAZ UR QL SCN: POSITIVE
CANNABINOIDS UR QL SCN: NEGATIVE
COCAINE UR QL SCN: NEGATIVE
FENTANYL UR QL SCN: NEGATIVE
MDMA UR QL SCN: NEGATIVE
OPIATES UR QL SCN: NEGATIVE
PCP UR QL: NEGATIVE
PH UR: 6 [PH] (ref 3–11)
SPECIFIC GRAVITY, URINE AUTO (.000) (OHS): 1.02 (ref 1–1.03)

## 2024-03-28 NOTE — ED PROVIDER NOTES
"Encounter Date: 3/27/2024    SCRIBE #1 NOTE: I, Gabriela Vu, am scribing for, and in the presence of,  Graham Cotton III, MD. I have scribed the following portions of the note - Other sections scribed: HPI, ROS, PE.       History     Chief Complaint   Patient presents with    Depression     Pt arrives via [facility], EMS reports pt called due to SI, however on arrival pt reports that he is depressed and anxious out of depression medication. Pt reports that he can't get in to see his PCP. Pt denies SI, HI, or hallucinations. Pt states that he " has been down this road before and know where it leads". He is concerned about his condition getting worse without medication.      38 year old male with history of anxiety, depression, PTSD, psychiatric hospitalizations, and seizures presents to the ED via EMS for depression. Pt reports that he was at [employer] and called EMS because he is depressed. He states that he is not suicidal but is worried "it's getting to that point" because his mother passed away 3 months ago and he just ran out of his psych medications. He notes that he called his psychiatrist today and he could not get an appointment until 2 weeks from now. Pt states that he has attempted suicide in the past when his father passed away. He denies AVH.     The history is provided by the patient. No  was used.     Review of patient's allergies indicates:   Allergen Reactions    Desyrel [trazodone] Shortness Of Breath    Haloperidol lactate Anaphylaxis and Other (See Comments)    Seroquel [quetiapine] Shortness Of Breath    Ziprasidone      Past Medical History:   Diagnosis Date    ADHD (attention deficit hyperactivity disorder)     Anxiety     Depression     Fatigue     History of psychiatric care     History of psychiatric hospitalization     about a year ago in [place]    Obsessive-compulsive disorder     Psychiatric exam requested by authority     Psychiatric problem     Psychosis     " PTSD (post-traumatic stress disorder)     Seizures     Suicide attempt     2008 by [facility]    Therapy     [facility]     Past Surgical History:   Procedure Laterality Date    hemorrhoidectomy  2013     Family History   Problem Relation Age of Onset    Anxiety disorder Mother     Seizures Mother     Heart attack Mother     Cancer Father     Hyperlipidemia Father     Hypertension Father     Diabetes Father     Bipolar disorder Father     Hypertension Sister     Anxiety disorder Maternal Aunt      Social History     Tobacco Use    Smoking status: Every Day     Current packs/day: 1.50     Average packs/day: 1.5 packs/day for 10.0 years (15.0 ttl pk-yrs)     Types: Cigarettes    Smokeless tobacco: Never   Substance Use Topics    Alcohol use: No    Drug use: No     Review of Systems   Psychiatric/Behavioral:  Negative for hallucinations and suicidal ideas.         Depressed       Physical Exam     Initial Vitals [03/27/24 2200]   BP Pulse Resp Temp SpO2   138/84 81 18 98.1 °F (36.7 °C) 98 %      MAP       --         Physical Exam    Nursing note and vitals reviewed.  Constitutional: No distress.   HENT:   Head: Normocephalic and atraumatic.   Neck: Trachea normal.   Cardiovascular:  Normal rate and regular rhythm.           No murmur heard.  Pulmonary/Chest: Breath sounds normal. No respiratory distress.   Abdominal: Abdomen is soft. Bowel sounds are normal. He exhibits no distension. There is no abdominal tenderness.   Musculoskeletal:         General: Normal range of motion.      Lumbar back: Normal.     Neurological: He is alert and oriented to person, place, and time. He has normal strength. No cranial nerve deficit.   Skin: Skin is warm and dry. No rash noted.   Psychiatric:   Flat affect. Depressed. No suicidal ideation.          ED Course   Procedures  Labs Reviewed   CARBAMAZEPINE LEVEL, TOTAL - Abnormal; Notable for the following components:       Result Value    Carbamazepine Level <1.9 (*)     All other  components within normal limits   PHENYTOIN LEVEL, TOTAL - Abnormal; Notable for the following components:    Phenytoin Level Total <1.8 (*)     All other components within normal limits   CBC WITH DIFFERENTIAL - Abnormal; Notable for the following components:    Hgb 13.7 (*)     Hct 41.1 (*)     All other components within normal limits   TSH - Normal   ALCOHOL,MEDICAL (ETHANOL) - Normal   CBC W/ AUTO DIFFERENTIAL    Narrative:     The following orders were created for panel order CBC auto differential.  Procedure                               Abnormality         Status                     ---------                               -----------         ------                     CBC with Differential[[id number]]       Abnormal            Final result                 Please view results for these tests on the individual orders.   COMPREHENSIVE METABOLIC PANEL   URINALYSIS, REFLEX TO URINE CULTURE   DRUG SCREEN, URINE (BEAKER)          Imaging Results    None          Medications - No data to display  Medical Decision Making  Repeated evaluations patient nonsuicidal makes good eye contact exhibits future thought speaks freely about his condition he states he is out of 3 of his medications and wants to start the medications before he gets suicidal he has follow-up.  He has family support.  Per him.  Patient repeated exams did not reveal any concerns for acute suicidal ideation or danger to himself or others.    Problems Addressed:  Depression, unspecified depression type: complicated acute illness or injury with systemic symptoms that poses a threat to life or bodily functions    Amount and/or Complexity of Data Reviewed  External Data Reviewed: notes.  Labs: ordered.    Risk  Prescription drug management.              Attending Attestation:           Physician Attestation for Scribe:  Physician Attestation Statement for Scribe #1: I, Graham Cotton III, MD, reviewed documentation, as scribed by Gabriela Vu in my  presence, and it is both accurate and complete.                                    Clinical Impression:  Final diagnoses:  [F32.A] Depression, unspecified depression type (Primary)          ED Disposition Condition    Discharge Stable          ED Prescriptions       Medication Sig Dispense Start Date End Date Auth. Provider    paroxetine (PAXIL) 30 MG tablet Take 1 tablet (30 mg total) by mouth once daily. 30 tablet 3/27/2024 4/26/2024 Graham Cotton III, MD    paroxetine (PAXIL) 20 MG tablet Take 1 tablet (20 mg total) by mouth every evening. 30 tablet 3/27/2024 3/27/2025 Graham Cotton III, MD    propranoloL (INDERAL) 40 MG tablet Take 1 tablet (40 mg total) by mouth 2 (two) times daily. 60 tablet 3/27/2024 4/26/2024 Graham Cotton III, MD    levETIRAcetam (KEPPRA) 500 MG Tab Take 1 tablet (500 mg total) by mouth once daily. 30 tablet 3/27/2024 3/27/2025 Graham Cotton III, MD          Follow-up Information       Follow up With Specialties Details Why Contact Info    Billy Clarke III, APRN-CNP Family Medicine In 1 week  [address] [facility] [id number] [phone]               Graham Cotton III, MD  03/27/24 [id number]

## 2024-05-31 ENCOUNTER — OFFICE VISIT (OUTPATIENT)
Dept: FAMILY MEDICINE | Facility: CLINIC | Age: 39
End: 2024-05-31
Payer: MEDICAID

## 2024-05-31 VITALS
BODY MASS INDEX: 19.61 KG/M2 | DIASTOLIC BLOOD PRESSURE: 80 MMHG | SYSTOLIC BLOOD PRESSURE: 118 MMHG | HEIGHT: 68 IN | WEIGHT: 129.38 LBS | TEMPERATURE: 98 F | HEART RATE: 66 BPM | OXYGEN SATURATION: 99 %

## 2024-05-31 DIAGNOSIS — F11.23 OPIOID DEPENDENCE WITH WITHDRAWAL: ICD-10-CM

## 2024-05-31 DIAGNOSIS — F32.A DEPRESSION, UNSPECIFIED DEPRESSION TYPE: ICD-10-CM

## 2024-05-31 DIAGNOSIS — G40.909 SEIZURE DISORDER: Primary | ICD-10-CM

## 2024-05-31 PROCEDURE — 3008F BODY MASS INDEX DOCD: CPT | Mod: CPTII,,, | Performed by: FAMILY MEDICINE

## 2024-05-31 PROCEDURE — 3079F DIAST BP 80-89 MM HG: CPT | Mod: CPTII,,, | Performed by: FAMILY MEDICINE

## 2024-05-31 PROCEDURE — 99204 OFFICE O/P NEW MOD 45 MIN: CPT | Mod: ,,, | Performed by: FAMILY MEDICINE

## 2024-05-31 PROCEDURE — 3074F SYST BP LT 130 MM HG: CPT | Mod: CPTII,,, | Performed by: FAMILY MEDICINE

## 2024-05-31 PROCEDURE — 1159F MED LIST DOCD IN RCRD: CPT | Mod: CPTII,,, | Performed by: FAMILY MEDICINE

## 2024-05-31 RX ORDER — BUPRENORPHINE HYDROCHLORIDE 8 MG/1
8 TABLET SUBLINGUAL 3 TIMES DAILY
Qty: 90 TABLET | Refills: 0 | Status: SHIPPED | OUTPATIENT
Start: 2024-05-31 | End: 2024-06-30

## 2024-05-31 RX ORDER — PREDNISOLONE ACETATE 10 MG/ML
SUSPENSION/ DROPS OPHTHALMIC 2 TIMES DAILY
COMMUNITY
Start: 2024-05-29

## 2024-05-31 RX ORDER — AZITHROMYCIN 250 MG/1
250 TABLET, FILM COATED ORAL DAILY
COMMUNITY
Start: 2024-05-29

## 2024-05-31 RX ORDER — TOBRAMYCIN 3 MG/ML
1 SOLUTION/ DROPS OPHTHALMIC 2 TIMES DAILY
COMMUNITY
Start: 2024-05-29

## 2024-05-31 RX ORDER — TEMAZEPAM 15 MG/1
1 CAPSULE ORAL NIGHTLY
COMMUNITY
Start: 2024-01-23

## 2024-05-31 NOTE — PROGRESS NOTES
SUBJECTIVE:  Laurent Marcos is a 39 y.o. male here for New suboxone pt      HPI  Patient has a new patient here for buprenorphine treatment.  He is 39 years old and began opioids at the age of 19 when his mother gave him a pill when he has been depressed.  He immediately felt better and was hooked.  Most of his life he is used hydrocodone oxycodone.  He is snorted oxycodone in the past.  He denies ever using heroin or fentanyl.  He has never had an overdose.  He is never used IV drugs.  He has a history of depression.  He has a history of seizure disorder in his now on Keppra which is working well.  He is currently using oxycodone and last use was last night.  He is starting to have some withdrawal today  Timis allergies, medications, history, and problem list were updated as appropriate.    Review of Systems   Constitutional:  Negative for activity change, appetite change, fatigue and fever.   HENT:  Negative for congestion, ear pain, hearing loss, sore throat and trouble swallowing.    Eyes:  Negative for photophobia, pain, redness and visual disturbance.   Respiratory:  Negative for cough, chest tightness, shortness of breath and wheezing.    Cardiovascular:  Negative for chest pain, palpitations and leg swelling.   Gastrointestinal:  Negative for abdominal distention, abdominal pain and blood in stool.   Endocrine: Negative for cold intolerance, heat intolerance, polydipsia and polyuria.   Genitourinary:  Negative for difficulty urinating, dysuria and frequency.   Musculoskeletal:  Negative for arthralgias, gait problem, joint swelling and myalgias.   Skin:  Negative for color change, pallor and rash.   Allergic/Immunologic: Negative.    Neurological:  Negative for dizziness, seizures, speech difficulty, weakness and headaches.   Hematological:  Negative for adenopathy. Does not bruise/bleed easily.   Psychiatric/Behavioral:  Negative for agitation and confusion.       A comprehensive review of symptoms was  "completed and negative except as noted above.    No results found for this or any previous visit (from the past 504 hour(s)).    OBJECTIVE:  Vital signs  Vitals:    05/31/24 1219 05/31/24 1245   BP: (!) 140/87 118/80   BP Location: Right arm    Patient Position: Sitting    Pulse: 66    Temp: 97.6 °F (36.4 °C)    TempSrc: Temporal    SpO2: 99%    Weight: 58.7 kg (129 lb 6.4 oz)    Height: 5' 8.11" (1.73 m)         Physical Exam  Vitals and nursing note reviewed.   Constitutional:       General: He is not in acute distress.     Appearance: Normal appearance. He is not ill-appearing.   HENT:      Head: Normocephalic and atraumatic.      Right Ear: External ear normal.      Left Ear: External ear normal.      Nose: Nose normal.      Mouth/Throat:      Mouth: Mucous membranes are moist.      Pharynx: Oropharynx is clear.   Eyes:      Extraocular Movements: Extraocular movements intact.      Conjunctiva/sclera: Conjunctivae normal.   Cardiovascular:      Rate and Rhythm: Normal rate and regular rhythm.      Heart sounds: Normal heart sounds. No murmur heard.  Pulmonary:      Effort: Pulmonary effort is normal.      Breath sounds: Normal breath sounds. No wheezing or rhonchi.   Abdominal:      General: Abdomen is flat. There is no distension.      Palpations: Abdomen is soft.      Tenderness: There is no abdominal tenderness.   Musculoskeletal:         General: No swelling or deformity. Normal range of motion.      Cervical back: Normal range of motion and neck supple.   Skin:     General: Skin is warm and dry.      Findings: No bruising or rash.   Neurological:      General: No focal deficit present.      Mental Status: He is alert and oriented to person, place, and time.      Cranial Nerves: No cranial nerve deficit.      Motor: No weakness.   Psychiatric:         Mood and Affect: Mood normal.         Behavior: Behavior normal.         Thought Content: Thought content normal.          ASSESSMENT/PLAN:  1. Seizure " disorder  On Keppra    2. Depression, unspecified depression type  Try to get him in with Cj as soon as possible in the office    3. Opioid dependence in mild withdrawal - he has tried Subutex in the past when he could get it off the streets and it worked well though the 1st time he used it he had a precipitated withdrawal.  I told him to start it later this evening when he gets into worsening withdrawal symptoms into start with just a quarter of a tablet sublingual.  After 30 minutes he can take another quarter and then if doing well take the rest of the entire tablet.  He will probably need 3 tablets a day    I would like to see him back in 3 weeks    Other orders  -     buprenorphine HCL (SUBUTEX) 8 mg Subl; Place 1 tablet (8 mg total) under the tongue 3 (three) times daily.  Dispense: 90 tablet; Refill: 0         Follow Up:  Follow up in about 3 weeks (around 6/21/2024).

## 2024-06-26 ENCOUNTER — OFFICE VISIT (OUTPATIENT)
Dept: FAMILY MEDICINE | Facility: CLINIC | Age: 39
End: 2024-06-26
Payer: MEDICAID

## 2024-06-26 VITALS
WEIGHT: 124.81 LBS | OXYGEN SATURATION: 99 % | BODY MASS INDEX: 18.91 KG/M2 | TEMPERATURE: 98 F | HEIGHT: 68 IN | SYSTOLIC BLOOD PRESSURE: 138 MMHG | DIASTOLIC BLOOD PRESSURE: 80 MMHG | HEART RATE: 116 BPM

## 2024-06-26 DIAGNOSIS — F11.20 UNCOMPLICATED OPIOID DEPENDENCE: Primary | ICD-10-CM

## 2024-06-26 DIAGNOSIS — F32.A DEPRESSION, UNSPECIFIED DEPRESSION TYPE: ICD-10-CM

## 2024-06-26 RX ORDER — BUPRENORPHINE HYDROCHLORIDE 8 MG/1
8 TABLET SUBLINGUAL 3 TIMES DAILY
Qty: 90 TABLET | Refills: 0 | Status: SHIPPED | OUTPATIENT
Start: 2024-06-26 | End: 2024-07-26

## 2024-06-26 RX ORDER — MIRTAZAPINE 15 MG/1
15 TABLET, FILM COATED ORAL NIGHTLY
Qty: 30 TABLET | Refills: 2 | Status: SHIPPED | OUTPATIENT
Start: 2024-06-26 | End: 2024-09-24

## 2024-06-26 NOTE — PROGRESS NOTES
"SUBJECTIVE:  Laurent Marcos is a 39 y.o. male here for 3 week follow up-meds      HPI  Patient here follow-up on recent initiation of Subutex.  He is doing very well on 8 mg 3 times a day.  On this dose he has not having any cravings.  Depression has been getting worse.  He would like to try something different for this.  He is taken Paxil in the past.  He is having trouble eating.  He has not sleeping well.  Laurent's allergies, medications, history, and problem list were updated as appropriate.    Review of Systems   See HPI.    No results found for this or any previous visit (from the past 504 hour(s)).    OBJECTIVE:  Vital signs  Vitals:    06/26/24 1529   BP: 138/80   BP Location: Right arm   Patient Position: Sitting   Pulse: (!) 116   Temp: 98.1 °F (36.7 °C)   TempSrc: Oral   SpO2: 99%   Weight: 56.6 kg (124 lb 12.8 oz)   Height: 5' 8.11" (1.73 m)        Physical Exam patient did get tearful.  No signs of sedation or withdrawal    ASSESSMENT/PLAN:  1. Uncomplicated opioid dependence  Continue Subutex 24 mg daily    2. Depression, unspecified depression type  Start mirtazapine 15 mg at night    Other orders  -     mirtazapine (REMERON) 15 MG tablet; Take 1 tablet (15 mg total) by mouth every evening.  Dispense: 30 tablet; Refill: 2  -     buprenorphine HCL (SUBUTEX) 8 mg Subl; Place 1 tablet (8 mg total) under the tongue 3 (three) times daily.  Dispense: 90 tablet; Refill: 0         Follow Up:  Follow up in about 3 weeks (around 7/17/2024).            "

## 2024-07-01 ENCOUNTER — DOCUMENTATION ONLY (OUTPATIENT)
Dept: INFECTIOUS DISEASES | Facility: CLINIC | Age: 39
End: 2024-07-01
Payer: MEDICAID

## 2024-07-09 ENCOUNTER — OFFICE VISIT (OUTPATIENT)
Dept: FAMILY MEDICINE | Facility: CLINIC | Age: 39
End: 2024-07-09
Payer: MEDICAID

## 2024-07-09 VITALS
WEIGHT: 120.81 LBS | HEART RATE: 101 BPM | OXYGEN SATURATION: 100 % | HEIGHT: 68 IN | SYSTOLIC BLOOD PRESSURE: 115 MMHG | DIASTOLIC BLOOD PRESSURE: 83 MMHG | BODY MASS INDEX: 18.31 KG/M2 | TEMPERATURE: 98 F

## 2024-07-09 DIAGNOSIS — G40.909 SEIZURE DISORDER: ICD-10-CM

## 2024-07-09 DIAGNOSIS — F11.20 UNCOMPLICATED OPIOID DEPENDENCE: Primary | ICD-10-CM

## 2024-07-09 DIAGNOSIS — F32.A DEPRESSION, UNSPECIFIED DEPRESSION TYPE: ICD-10-CM

## 2024-07-09 LAB
ALBUMIN SERPL-MCNC: 4.9 G/DL (ref 3.4–5)
ALBUMIN/GLOB SERPL: 1.5 RATIO
ALP SERPL-CCNC: 58 UNIT/L (ref 50–144)
ALT SERPL-CCNC: 19 UNIT/L (ref 1–45)
ANION GAP SERPL CALC-SCNC: 10 MEQ/L (ref 2–13)
AST SERPL-CCNC: 28 UNIT/L (ref 17–59)
BASOPHILS # BLD AUTO: 0.03 X10(3)/MCL (ref 0.01–0.08)
BASOPHILS NFR BLD AUTO: 0.6 % (ref 0.1–1.2)
BILIRUB SERPL-MCNC: 0.4 MG/DL (ref 0–1)
BUN SERPL-MCNC: 14 MG/DL (ref 7–20)
CALCIUM SERPL-MCNC: 10.5 MG/DL (ref 8.4–10.2)
CHLORIDE SERPL-SCNC: 103 MMOL/L (ref 98–110)
CO2 SERPL-SCNC: 28 MMOL/L (ref 21–32)
CREAT SERPL-MCNC: 0.92 MG/DL (ref 0.66–1.25)
CREAT/UREA NIT SERPL: 15 (ref 12–20)
EOSINOPHIL # BLD AUTO: 0.31 X10(3)/MCL (ref 0.04–0.54)
EOSINOPHIL NFR BLD AUTO: 6.7 % (ref 0.7–7)
ERYTHROCYTE [DISTWIDTH] IN BLOOD BY AUTOMATED COUNT: 12.9 %
GFR SERPLBLD CREATININE-BSD FMLA CKD-EPI: >90 ML/MIN/1.73/M2
GLOBULIN SER-MCNC: 3.3 GM/DL (ref 2–3.9)
GLUCOSE SERPL-MCNC: 94 MG/DL (ref 70–115)
HCT VFR BLD AUTO: 44.2 % (ref 36–52)
HGB BLD-MCNC: 15.1 G/DL (ref 13–18)
IMM GRANULOCYTES # BLD AUTO: 0 X10(3)/MCL (ref 0–0.03)
IMM GRANULOCYTES NFR BLD AUTO: 0 % (ref 0–0.5)
LYMPHOCYTES # BLD AUTO: 1.52 X10(3)/MCL (ref 1.32–3.57)
LYMPHOCYTES NFR BLD AUTO: 32.8 % (ref 20–55)
MCH RBC QN AUTO: 28.1 PG (ref 27–34)
MCHC RBC AUTO-ENTMCNC: 34.2 G/DL (ref 31–37)
MCV RBC AUTO: 82.2 FL (ref 79–99)
MONOCYTES # BLD AUTO: 0.34 X10(3)/MCL (ref 0.3–0.82)
MONOCYTES NFR BLD AUTO: 7.3 % (ref 4.7–12.5)
NEUTROPHILS # BLD AUTO: 2.44 X10(3)/MCL (ref 1.78–5.38)
NEUTROPHILS NFR BLD AUTO: 52.6 % (ref 37–73)
NRBC BLD AUTO-RTO: 0 %
PLATELET # BLD AUTO: 210 X10(3)/MCL (ref 140–371)
PMV BLD AUTO: 10.8 FL (ref 9.4–12.4)
POTASSIUM SERPL-SCNC: 4.5 MMOL/L (ref 3.5–5.1)
PROT SERPL-MCNC: 8.2 GM/DL (ref 6.3–8.2)
RBC # BLD AUTO: 5.38 X10(6)/MCL (ref 4–6)
SODIUM SERPL-SCNC: 141 MMOL/L (ref 136–145)
WBC # BLD AUTO: 4.64 X10(3)/MCL (ref 4–11.5)

## 2024-07-09 PROCEDURE — 80053 COMPREHEN METABOLIC PANEL: CPT | Performed by: FAMILY MEDICINE

## 2024-07-09 PROCEDURE — 1159F MED LIST DOCD IN RCRD: CPT | Mod: CPTII,,, | Performed by: FAMILY MEDICINE

## 2024-07-09 PROCEDURE — 3008F BODY MASS INDEX DOCD: CPT | Mod: CPTII,,, | Performed by: FAMILY MEDICINE

## 2024-07-09 PROCEDURE — 80161 ASY CARBAMAZEPIN 10,11-EPXID: CPT | Performed by: FAMILY MEDICINE

## 2024-07-09 PROCEDURE — 99214 OFFICE O/P EST MOD 30 MIN: CPT | Mod: ,,, | Performed by: FAMILY MEDICINE

## 2024-07-09 PROCEDURE — 85025 COMPLETE CBC W/AUTO DIFF WBC: CPT | Performed by: FAMILY MEDICINE

## 2024-07-09 PROCEDURE — 3079F DIAST BP 80-89 MM HG: CPT | Mod: CPTII,,, | Performed by: FAMILY MEDICINE

## 2024-07-09 PROCEDURE — 80177 DRUG SCRN QUAN LEVETIRACETAM: CPT | Performed by: FAMILY MEDICINE

## 2024-07-09 PROCEDURE — 3074F SYST BP LT 130 MM HG: CPT | Mod: CPTII,,, | Performed by: FAMILY MEDICINE

## 2024-07-09 RX ORDER — LEVETIRACETAM 500 MG/1
500 TABLET ORAL
COMMUNITY
Start: 2024-07-04 | End: 2024-07-11

## 2024-07-09 RX ORDER — CLONAZEPAM 1 MG/1
1 TABLET ORAL 2 TIMES DAILY
Qty: 60 TABLET | Refills: 2 | Status: SHIPPED | OUTPATIENT
Start: 2024-07-09 | End: 2024-10-07

## 2024-07-09 RX ORDER — PROPRANOLOL HYDROCHLORIDE 10 MG/1
10 TABLET ORAL
COMMUNITY
Start: 2024-07-04 | End: 2024-07-11

## 2024-07-09 RX ORDER — ALPRAZOLAM 1 MG/1
1 TABLET ORAL DAILY PRN
COMMUNITY
Start: 2024-07-04 | End: 2024-07-09

## 2024-07-09 NOTE — PROGRESS NOTES
"SUBJECTIVE:  Laurent Marcos is a 39 y.o. male here for Anxiety and Follow-up      HPI  Patient is here for follow-up on anxiety depression and opioid dependence.  Last week he was at the emergency room with his friend when he had a grand mal seizure.  He had urinary incontinence and loss of consciousness.  He has a long history of seizure disorder and has been having them a few times a year.  He was recently started on mirtazapine but also recently stopped taking alprazolam abruptly.  He has been on 2 mg daily prior to this.  Timis allergies, medications, history, and problem list were updated as appropriate.    Review of Systems   See HPI    No results found for this or any previous visit (from the past 504 hour(s)).    OBJECTIVE:  Vital signs  Vitals:    07/09/24 1425   BP: 115/83   BP Location: Right arm   Patient Position: Sitting   Pulse: 101   Temp: 97.5 °F (36.4 °C)   TempSrc: Oral   SpO2: 100%   Weight: 54.8 kg (120 lb 12.8 oz)   Height: 5' 8.11" (1.73 m)        Physical Exam normal speech and affect    ASSESSMENT/PLAN:  1. Uncomplicated opioid dependence  Continue Subutex 24 mg daily  -     Levetiracetam Level; Future; Expected date: 07/09/2024  -     Carbamazepine Level, Total; Future; Expected date: 07/09/2024  -     CBC Auto Differential; Future; Expected date: 07/09/2024  -     Comprehensive Metabolic Panel; Future; Expected date: 07/09/2024    2. Depression, unspecified depression type  Discontinue mirtazapine because of recent seizure.  We may try something different in the future  -     Levetiracetam Level; Future; Expected date: 07/09/2024  -     Carbamazepine Level, Total; Future; Expected date: 07/09/2024  -     CBC Auto Differential; Future; Expected date: 07/09/2024  -     Comprehensive Metabolic Panel; Future; Expected date: 07/09/2024    3. Seizure disorder  Check levels of carbamazepine and Keppra today to see if we need to increase the doses  -     Levetiracetam Level; Future; Expected " date: 07/09/2024  -     Carbamazepine Level, Total; Future; Expected date: 07/09/2024  -     CBC Auto Differential; Future; Expected date: 07/09/2024  -     Comprehensive Metabolic Panel; Future; Expected date: 07/09/2024    Other orders  -     clonazePAM (KLONOPIN) 1 MG tablet; Take 1 tablet (1 mg total) by mouth 2 (two) times daily.  Dispense: 60 tablet; Refill: 2         Follow Up:  Follow up in about 2 weeks (around 7/23/2024).

## 2024-07-10 LAB — CARBAMAZEPINE FREE SERPL-MCNC: <1.9 UG/ML (ref 4–12)

## 2024-07-11 LAB — LEVETIRACETAM SERPL-MCNC: <1 MCG/ML (ref 10–40)

## 2024-07-18 ENCOUNTER — E-VISIT (OUTPATIENT)
Dept: FAMILY MEDICINE | Facility: CLINIC | Age: 39
End: 2024-07-18
Payer: MEDICAID

## 2024-07-18 DIAGNOSIS — G40.909 SEIZURE DISORDER: ICD-10-CM

## 2024-07-18 DIAGNOSIS — F32.A DEPRESSION, UNSPECIFIED DEPRESSION TYPE: Primary | ICD-10-CM

## 2024-07-18 RX ORDER — CLONAZEPAM 1 MG/1
1 TABLET ORAL 2 TIMES DAILY
Qty: 60 TABLET | Refills: 2 | Status: SHIPPED | OUTPATIENT
Start: 2024-07-18 | End: 2024-10-16

## 2024-07-18 NOTE — PROGRESS NOTES
SUBJECTIVE:  Laurent Marcos is a 39 y.o. adult here for Mood Disorder (Entered automatically based on patient selection in [employer].)      HPI  Patient requested a E visit for his anxiety and depression.  He recently found out some bad news and was very stressed.  He had a seizure.  It sounds like he was brought to the hospital and even on a ventilator for a short time.  He is requesting to get on alprazolam.  He has been on Klonopin 1 mg twice a day.  Also on buprenorphine therapy  Laurent's allergies, medications, history, and problem list were updated as appropriate.    Review of Systems   See HPI.    Recent Results (from the past 504 hour(s))   Levetiracetam Level    Collection Time: 07/09/24  3:13 PM   Result Value Ref Range    Levetiracetam, S <1.0 (L) 10.0 - 40.0 mcg/mL   Carbamazepine Level, Total    Collection Time: 07/09/24  3:13 PM   Result Value Ref Range    Carbamazepine Level <1.9 (L) 4.0 - 12.0 ug/ml   Comprehensive Metabolic Panel    Collection Time: 07/09/24  3:13 PM   Result Value Ref Range    Sodium 141 136 - 145 mmol/L    Potassium 4.5 3.5 - 5.1 mmol/L    Chloride 103 98 - 110 mmol/L    CO2 28 21 - 32 mmol/L    Glucose 94 70 - 115 mg/dL    Blood Urea Nitrogen 14 7.0 - 20.0 mg/dL    Creatinine 0.92 0.66 - 1.25 mg/dL    Calcium 10.5 (H) 8.4 - 10.2 mg/dL    Protein Total 8.2 6.3 - 8.2 gm/dL    Albumin 4.9 3.4 - 5.0 g/dL    Globulin 3.3 2.0 - 3.9 gm/dL    Albumin/Globulin Ratio 1.5 ratio    Bilirubin Total 0.4 0.0 - 1.0 mg/dL    ALP 58 50 - 144 unit/L    ALT 19 1 - 45 unit/L    AST 28 17 - 59 unit/L    eGFR >90 mL/min/1.73/m2    Anion Gap 10.0 2.0 - 13.0 mEq/L    BUN/Creatinine Ratio 15 12 - 20   CBC with Differential    Collection Time: 07/09/24  3:13 PM   Result Value Ref Range    WBC 4.64 4.00 - 11.50 x10(3)/mcL    RBC 5.38 4.00 - 6.00 x10(6)/mcL    Hgb 15.1 13.0 - 18.0 g/dL    Hct 44.2 36.0 - 52.0 %    MCV 82.2 79.0 - 99.0 fL    MCH 28.1 27.0 - 34.0 pg    MCHC 34.2 31.0 - 37.0 g/dL    RDW 12.9 %     Platelet 210 140 - 371 x10(3)/mcL    MPV 10.8 9.4 - 12.4 fL    Neut % 52.6 37 - 73 %    Lymph % 32.8 20 - 55 %    Mono % 7.3 4.7 - 12.5 %    Eos % 6.7 0.7 - 7 %    Basophil % 0.6 0.1 - 1.2 %    Lymph # 1.52 1.32 - 3.57 x10(3)/mcL    Neut # 2.44 1.78 - 5.38 x10(3)/mcL    Mono # 0.34 0.3 - 0.82 x10(3)/mcL    Eos # 0.31 0.04 - 0.54 x10(3)/mcL    Baso # 0.03 0.01 - 0.08 x10(3)/mcL    IG# 0.00 (L) 0.0001 - 0.031 x10(3)/mcL    IG% 0.0 0 - 0.5 %    NRBC% 0.0 <=1 %       OBJECTIVE:  Vital signs  There were no vitals filed for this visit.     Physical Exam this is an E visit.    ASSESSMENT/PLAN:  1. Depression, unspecified depression type  I informed the patient that I really did not feel comfortable sending Xanax in an E visit in this would require visit to be seen    2. Seizure disorder  Recent Keppra level was low so this may need to be increased       This visit took 7 minutes of time  Follow Up:  No follow-ups on file.

## 2024-08-02 ENCOUNTER — OFFICE VISIT (OUTPATIENT)
Dept: FAMILY MEDICINE | Facility: CLINIC | Age: 39
End: 2024-08-02
Payer: MEDICAID

## 2024-08-02 ENCOUNTER — PATIENT MESSAGE (OUTPATIENT)
Dept: FAMILY MEDICINE | Facility: CLINIC | Age: 39
End: 2024-08-02

## 2024-08-02 VITALS
DIASTOLIC BLOOD PRESSURE: 72 MMHG | TEMPERATURE: 98 F | HEIGHT: 68 IN | SYSTOLIC BLOOD PRESSURE: 110 MMHG | HEART RATE: 85 BPM | OXYGEN SATURATION: 98 % | BODY MASS INDEX: 18.43 KG/M2 | WEIGHT: 121.63 LBS

## 2024-08-02 DIAGNOSIS — G40.909 SEIZURE DISORDER: ICD-10-CM

## 2024-08-02 DIAGNOSIS — F11.20 UNCOMPLICATED OPIOID DEPENDENCE: Primary | ICD-10-CM

## 2024-08-02 LAB
ALBUMIN SERPL-MCNC: 4.8 G/DL (ref 3.4–5)
ALBUMIN/GLOB SERPL: 1.6 RATIO
ALP SERPL-CCNC: 64 UNIT/L (ref 50–144)
ALT SERPL-CCNC: 16 UNIT/L (ref 1–45)
ANION GAP SERPL CALC-SCNC: 8 MEQ/L (ref 2–13)
AST SERPL-CCNC: 28 UNIT/L (ref 17–59)
BASOPHILS # BLD AUTO: 0.03 X10(3)/MCL (ref 0.01–0.08)
BASOPHILS NFR BLD AUTO: 0.5 % (ref 0.1–1.2)
BILIRUB SERPL-MCNC: 0.4 MG/DL (ref 0–1)
BUN SERPL-MCNC: 21 MG/DL (ref 7–20)
CALCIUM SERPL-MCNC: 10.1 MG/DL (ref 8.4–10.2)
CHLORIDE SERPL-SCNC: 104 MMOL/L (ref 98–110)
CO2 SERPL-SCNC: 27 MMOL/L (ref 21–32)
CREAT SERPL-MCNC: 0.89 MG/DL (ref 0.66–1.25)
CREAT/UREA NIT SERPL: 24 (ref 12–20)
EOSINOPHIL # BLD AUTO: 0.16 X10(3)/MCL (ref 0.04–0.54)
EOSINOPHIL NFR BLD AUTO: 2.9 % (ref 0.7–7)
ERYTHROCYTE [DISTWIDTH] IN BLOOD BY AUTOMATED COUNT: 12.9 %
GFR SERPLBLD CREATININE-BSD FMLA CKD-EPI: >90 ML/MIN/1.73/M2
GLOBULIN SER-MCNC: 3 GM/DL (ref 2–3.9)
GLUCOSE SERPL-MCNC: 84 MG/DL (ref 70–115)
HCT VFR BLD AUTO: 37.5 % (ref 36–52)
HGB BLD-MCNC: 13 G/DL (ref 13–18)
IMM GRANULOCYTES # BLD AUTO: 0.01 X10(3)/MCL (ref 0–0.03)
IMM GRANULOCYTES NFR BLD AUTO: 0.2 % (ref 0–0.5)
LYMPHOCYTES # BLD AUTO: 1.88 X10(3)/MCL (ref 0.6–4.6)
LYMPHOCYTES NFR BLD AUTO: 33.8 % (ref 20–55)
MCH RBC QN AUTO: 28.1 PG (ref 27–34)
MCHC RBC AUTO-ENTMCNC: 34.7 G/DL (ref 31–37)
MCV RBC AUTO: 81 FL (ref 79–99)
MONOCYTES # BLD AUTO: 0.43 X10(3)/MCL (ref 0.3–0.82)
MONOCYTES NFR BLD AUTO: 7.7 % (ref 4.7–12.5)
NEUTROPHILS # BLD AUTO: 3.05 X10(3)/MCL (ref 2.1–9.2)
NEUTROPHILS NFR BLD AUTO: 54.9 % (ref 37–73)
NRBC BLD AUTO-RTO: 0 %
PLATELET # BLD AUTO: 217 X10(3)/MCL (ref 140–371)
PMV BLD AUTO: 10.5 FL (ref 9.4–12.4)
POTASSIUM SERPL-SCNC: 4.5 MMOL/L (ref 3.5–5.1)
PROT SERPL-MCNC: 7.8 GM/DL (ref 6.3–8.2)
RBC # BLD AUTO: 4.63 X10(6)/MCL
SODIUM SERPL-SCNC: 139 MMOL/L (ref 136–145)
WBC # BLD AUTO: 5.56 X10(3)/MCL (ref 4–11.5)

## 2024-08-02 PROCEDURE — 87536 HIV-1 QUANT&REVRSE TRNSCRPJ: CPT | Performed by: FAMILY MEDICINE

## 2024-08-02 PROCEDURE — 80053 COMPREHEN METABOLIC PANEL: CPT | Performed by: FAMILY MEDICINE

## 2024-08-02 PROCEDURE — 85025 COMPLETE CBC W/AUTO DIFF WBC: CPT | Performed by: FAMILY MEDICINE

## 2024-08-02 PROCEDURE — 80177 DRUG SCRN QUAN LEVETIRACETAM: CPT | Performed by: FAMILY MEDICINE

## 2024-08-02 RX ORDER — BUPRENORPHINE HYDROCHLORIDE 8 MG/1
8 TABLET SUBLINGUAL 3 TIMES DAILY
Qty: 90 TABLET | Refills: 0 | Status: SHIPPED | OUTPATIENT
Start: 2024-08-02 | End: 2024-09-01

## 2024-08-02 RX ORDER — OLANZAPINE 5 MG/1
5 TABLET ORAL NIGHTLY
Qty: 30 TABLET | Refills: 0 | Status: SHIPPED | OUTPATIENT
Start: 2024-08-02 | End: 2024-09-01

## 2024-08-02 RX ORDER — BUPRENORPHINE HYDROCHLORIDE 8 MG/1
8 TABLET SUBLINGUAL 3 TIMES DAILY
COMMUNITY
End: 2024-08-02 | Stop reason: SDUPTHER

## 2024-08-02 NOTE — PROGRESS NOTES
"SUBJECTIVE:  Laurent Marcos is a 39 y.o. adult here for 2 week follow up-suboxone  (States he has had a 'Black out')      HPI  Patient is here for follow-up on chronic opioid dependence.  He is also very distress today.  He recently found out his viral load has been going up from his HIV status.  He has been unable to take certain medications.  He is very distraught about this.  The Suboxone has been doing well.  He has been having increase spells where he just passes out.  Some of these maybe seizures.  He is on Keppra for this  Laurent's allergies, medications, history, and problem list were updated as appropriate.    Review of Systems   See HPI    No results found for this or any previous visit (from the past 504 hour(s)).    OBJECTIVE:  Vital signs  Vitals:    08/02/24 1108   BP: 110/72   BP Location: Left arm   Patient Position: Sitting   Pulse: 85   Temp: 98.3 °F (36.8 °C)   TempSrc: Temporal   SpO2: 98%   Weight: 55.2 kg (121 lb 9.6 oz)   Height: 5' 8.11" (1.73 m)        Physical Exam does not anxious today.  No acute distress    ASSESSMENT/PLAN:  1. Uncomplicated opioid dependence  Continue Suboxone 8 mg 3 times a day  -     HIV RNA, Quantitative, PCR; Future; Expected date: 08/02/2024  -     Levetiracetam Level; Future; Expected date: 08/02/2024  -     CBC Auto Differential; Future; Expected date: 08/02/2024  -     Comprehensive Metabolic Panel; Future; Expected date: 08/02/2024    2. Seizure disorder  Check his Keppra level today see if we can increase the dose.  Also check a CBC and a CMP  -     HIV RNA, Quantitative, PCR; Future; Expected date: 08/02/2024  -     Levetiracetam Level; Future; Expected date: 08/02/2024  -     CBC Auto Differential; Future; Expected date: 08/02/2024  -     Comprehensive Metabolic Panel; Future; Expected date: 08/02/2024  3. Positive HIV status - I would like to check a viral load today and get him in with someone else his viral load is not down to 0  Other orders  -     " buprenorphine HCL (SUBUTEX) 8 mg Subl; Place 1 tablet (8 mg total) under the tongue 3 (three) times daily.  Dispense: 90 tablet; Refill: 0  -     OLANZapine (ZYPREXA) 5 MG tablet; Take 1 tablet (5 mg total) by mouth every evening.  Dispense: 30 tablet; Refill: 0         Follow Up:  Follow up in about 1 month (around 9/2/2024).

## 2024-08-05 ENCOUNTER — PATIENT MESSAGE (OUTPATIENT)
Dept: FAMILY MEDICINE | Facility: CLINIC | Age: 39
End: 2024-08-05
Payer: MEDICAID

## 2024-08-05 RX ORDER — BUPRENORPHINE HYDROCHLORIDE 8 MG/1
8 TABLET SUBLINGUAL 3 TIMES DAILY
Qty: 21 TABLET | Refills: 0 | Status: SHIPPED | OUTPATIENT
Start: 2024-08-05 | End: 2024-08-05 | Stop reason: SDUPTHER

## 2024-08-05 RX ORDER — BUPRENORPHINE HYDROCHLORIDE 8 MG/1
8 TABLET SUBLINGUAL 3 TIMES DAILY
Qty: 90 TABLET | Refills: 1 | Status: SHIPPED | OUTPATIENT
Start: 2024-08-05 | End: 2024-08-05 | Stop reason: SDUPTHER

## 2024-08-06 ENCOUNTER — PATIENT MESSAGE (OUTPATIENT)
Dept: FAMILY MEDICINE | Facility: CLINIC | Age: 39
End: 2024-08-06
Payer: MEDICAID

## 2024-08-06 RX ORDER — OLANZAPINE 5 MG/1
5 TABLET ORAL NIGHTLY
Qty: 30 TABLET | Refills: 0 | Status: SHIPPED | OUTPATIENT
Start: 2024-08-06 | End: 2024-09-05

## 2024-08-06 RX ORDER — BUPRENORPHINE HYDROCHLORIDE 8 MG/1
8 TABLET SUBLINGUAL 3 TIMES DAILY
Qty: 90 TABLET | Refills: 1 | Status: SHIPPED | OUTPATIENT
Start: 2024-08-06 | End: 2024-10-05

## 2024-08-07 ENCOUNTER — HOSPITAL ENCOUNTER (EMERGENCY)
Facility: HOSPITAL | Age: 39
Discharge: PSYCHIATRIC OR SUBSTANCE USE FACILITY | End: 2024-08-08
Attending: INTERNAL MEDICINE
Payer: MEDICAID

## 2024-08-07 DIAGNOSIS — R45.851 SUICIDAL IDEATIONS: Primary | ICD-10-CM

## 2024-08-07 DIAGNOSIS — R45.851 DEPRESSION WITH SUICIDAL IDEATION: ICD-10-CM

## 2024-08-07 DIAGNOSIS — F32.A DEPRESSION WITH SUICIDAL IDEATION: ICD-10-CM

## 2024-08-07 DIAGNOSIS — Z00.8 MEDICAL CLEARANCE FOR PSYCHIATRIC ADMISSION: ICD-10-CM

## 2024-08-07 LAB
ALBUMIN SERPL-MCNC: 3.7 G/DL (ref 3.5–5)
ALBUMIN/GLOB SERPL: 1.3 RATIO (ref 1.1–2)
ALP SERPL-CCNC: 57 UNIT/L (ref 40–150)
ALT SERPL-CCNC: 10 UNIT/L (ref 0–55)
AMPHET UR QL SCN: POSITIVE
ANION GAP SERPL CALC-SCNC: 8 MEQ/L
APAP SERPL-MCNC: <3 UG/ML (ref 10–30)
AST SERPL-CCNC: 14 UNIT/L (ref 5–34)
BACTERIA #/AREA URNS AUTO: ABNORMAL /HPF
BARBITURATE SCN PRESENT UR: NEGATIVE
BASOPHILS # BLD AUTO: 0.02 X10(3)/MCL
BASOPHILS NFR BLD AUTO: 0.5 %
BENZODIAZ UR QL SCN: POSITIVE
BILIRUB SERPL-MCNC: 0.3 MG/DL
BILIRUB UR QL STRIP.AUTO: NEGATIVE
BUN SERPL-MCNC: 11 MG/DL (ref 8.9–20.6)
CALCIUM SERPL-MCNC: 9.3 MG/DL (ref 8.4–10.2)
CANNABINOIDS UR QL SCN: NEGATIVE
CHLORIDE SERPL-SCNC: 107 MMOL/L (ref 98–107)
CLARITY UR: CLEAR
CO2 SERPL-SCNC: 28 MMOL/L (ref 22–29)
COCAINE UR QL SCN: NEGATIVE
COLOR UR AUTO: YELLOW
CREAT SERPL-MCNC: 0.85 MG/DL (ref 0.73–1.18)
CREAT/UREA NIT SERPL: 13
EOSINOPHIL # BLD AUTO: 0.33 X10(3)/MCL (ref 0–0.9)
EOSINOPHIL NFR BLD AUTO: 7.5 %
ERYTHROCYTE [DISTWIDTH] IN BLOOD BY AUTOMATED COUNT: 13 % (ref 11.5–17)
ETHANOL SERPL-MCNC: <10 MG/DL
FENTANYL UR QL SCN: NEGATIVE
GFR SERPLBLD CREATININE-BSD FMLA CKD-EPI: >60 ML/MIN/1.73/M2
GLOBULIN SER-MCNC: 2.8 GM/DL (ref 2.4–3.5)
GLUCOSE SERPL-MCNC: 73 MG/DL (ref 74–100)
GLUCOSE UR QL STRIP: NEGATIVE
HCT VFR BLD AUTO: 39 % (ref 42–52)
HGB BLD-MCNC: 12.9 G/DL (ref 14–18)
HGB UR QL STRIP: ABNORMAL
HYALINE CASTS URNS QL MICRO: ABNORMAL /LPF
IMM GRANULOCYTES # BLD AUTO: 0.01 X10(3)/MCL (ref 0–0.04)
IMM GRANULOCYTES NFR BLD AUTO: 0.2 %
KETONES UR QL STRIP: NEGATIVE
LEUKOCYTE ESTERASE UR QL STRIP: NEGATIVE
LYMPHOCYTES # BLD AUTO: 1.48 X10(3)/MCL (ref 0.6–4.6)
LYMPHOCYTES NFR BLD AUTO: 33.6 %
MCH RBC QN AUTO: 27.6 PG (ref 27–31)
MCHC RBC AUTO-ENTMCNC: 33.1 G/DL (ref 33–36)
MCV RBC AUTO: 83.5 FL (ref 80–94)
MDMA UR QL SCN: NEGATIVE
MONOCYTES # BLD AUTO: 0.31 X10(3)/MCL (ref 0.1–1.3)
MONOCYTES NFR BLD AUTO: 7 %
MUCOUS THREADS URNS QL MICRO: ABNORMAL /LPF
NEUTROPHILS # BLD AUTO: 2.26 X10(3)/MCL (ref 2.1–9.2)
NEUTROPHILS NFR BLD AUTO: 51.2 %
NITRITE UR QL STRIP: NEGATIVE
OPIATES UR QL SCN: NEGATIVE
PCP UR QL: NEGATIVE
PH UR STRIP: 6.5 [PH]
PH UR: 6.5 [PH] (ref 3–11)
PLATELET # BLD AUTO: 176 X10(3)/MCL (ref 130–400)
PMV BLD AUTO: 9.8 FL (ref 7.4–10.4)
POTASSIUM SERPL-SCNC: 3.6 MMOL/L (ref 3.5–5.1)
PROT SERPL-MCNC: 6.5 GM/DL (ref 6.4–8.3)
PROT UR QL STRIP: NEGATIVE
RBC # BLD AUTO: 4.67 X10(6)/MCL
RBC #/AREA URNS AUTO: ABNORMAL /HPF
SODIUM SERPL-SCNC: 143 MMOL/L (ref 136–145)
SP GR UR STRIP.AUTO: 1.02 (ref 1–1.03)
SPECIFIC GRAVITY, URINE AUTO (.000) (OHS): 1.02 (ref 1–1.03)
SPERM URNS QL MICRO: ABNORMAL /HPF
SQUAMOUS #/AREA URNS AUTO: ABNORMAL /HPF
TSH SERPL-ACNC: 0.61 UIU/ML (ref 0.35–4.94)
UROBILINOGEN UR STRIP-ACNC: 0.2
WBC # BLD AUTO: 4.41 X10(3)/MCL (ref 4.5–11.5)
WBC #/AREA URNS AUTO: ABNORMAL /HPF

## 2024-08-07 PROCEDURE — 63600175 PHARM REV CODE 636 W HCPCS: Performed by: INTERNAL MEDICINE

## 2024-08-07 PROCEDURE — 82077 ASSAY SPEC XCP UR&BREATH IA: CPT | Performed by: INTERNAL MEDICINE

## 2024-08-07 PROCEDURE — 81003 URINALYSIS AUTO W/O SCOPE: CPT | Performed by: INTERNAL MEDICINE

## 2024-08-07 PROCEDURE — 85025 COMPLETE CBC W/AUTO DIFF WBC: CPT | Performed by: INTERNAL MEDICINE

## 2024-08-07 PROCEDURE — 80143 DRUG ASSAY ACETAMINOPHEN: CPT | Performed by: INTERNAL MEDICINE

## 2024-08-07 PROCEDURE — 93010 ELECTROCARDIOGRAM REPORT: CPT | Mod: ,,, | Performed by: INTERNAL MEDICINE

## 2024-08-07 PROCEDURE — 96372 THER/PROPH/DIAG INJ SC/IM: CPT | Performed by: INTERNAL MEDICINE

## 2024-08-07 PROCEDURE — 99285 EMERGENCY DEPT VISIT HI MDM: CPT | Mod: 25

## 2024-08-07 PROCEDURE — 84443 ASSAY THYROID STIM HORMONE: CPT | Performed by: INTERNAL MEDICINE

## 2024-08-07 PROCEDURE — 93005 ELECTROCARDIOGRAM TRACING: CPT

## 2024-08-07 PROCEDURE — 80307 DRUG TEST PRSMV CHEM ANLYZR: CPT | Performed by: INTERNAL MEDICINE

## 2024-08-07 PROCEDURE — 80053 COMPREHEN METABOLIC PANEL: CPT | Performed by: INTERNAL MEDICINE

## 2024-08-07 PROCEDURE — 81001 URINALYSIS AUTO W/SCOPE: CPT | Mod: XB | Performed by: INTERNAL MEDICINE

## 2024-08-07 RX ORDER — PROCHLORPERAZINE EDISYLATE 5 MG/ML
10 INJECTION INTRAMUSCULAR; INTRAVENOUS
Status: COMPLETED | OUTPATIENT
Start: 2024-08-07 | End: 2024-08-07

## 2024-08-07 RX ADMIN — PROCHLORPERAZINE EDISYLATE 10 MG: 5 INJECTION INTRAMUSCULAR; INTRAVENOUS at 01:08

## 2024-08-07 NOTE — ED PROVIDER NOTES
Date: 8/7/2024  Time of Note: 1:30 PM   Source of History:  History obtained from the patient.   Chief complaint:  Suicidal (Depressed with SI,  no plan)      HPI:  Laurent Marcos is a 39 y.o. year old adult with history of  has a past medical history of ADHD (attention deficit hyperactivity disorder), Anxiety, Depression, Fatigue, History of psychiatric care, History of psychiatric hospitalization, Human immunodeficiency virus (HIV) disease, Obsessive-compulsive disorder, Psychiatric exam requested by authority, Psychiatric problem, Psychosis, PTSD (post-traumatic stress disorder), Seizures, Suicide attempt, and Therapy. presenting with Suicidal (Depressed with SI,  no plan)    Review of Systems:    As per HPI and below:  Constitutional: No Fever.  No Chills.  Eyes: No Visual Changes.  ENT: None voiced by patient.    Cardiovascular: No Chest Pain.  Respiratory: No Shortness of breath. No Cough  GastrointestinaI: No Abdominal Pain.  No Nausea No Vomiting. No Diarrhea, No Constipation.  Genitourinary: No Dysuria  Neurologic: No Headache. No New Focal Weakness.  Musculoskeletal: No Back Pain.  Psychiatric:  Depressed and suicidal    Review of patient's allergies indicates:   Allergen Reactions    Desyrel [trazodone] Shortness Of Breath    Haloperidol lactate Anaphylaxis and Other (See Comments)    Seroquel [quetiapine] Shortness Of Breath    Ziprasidone        Current Outpatient Medications   Medication Instructions    buprenorphine HCL (SUBUTEX) 8 mg, Sublingual, 3 times daily    cyproheptadine (PERIACTIN) 4 mg tablet Oral, 2 times daily     mg tablet TAKE 1 TABLET BY MOUTH EVERY 8 HOURS AS NEEDED WITH FOOD    levETIRAcetam (KEPPRA) 500 mg, Oral    OLANZapine (ZYPREXA) 5 mg, Oral, Nightly    propranoloL (INDERAL) 10 mg, Oral       Past Medical History:   Diagnosis Date    ADHD (attention deficit hyperactivity disorder)     Anxiety     Depression     Fatigue     History of psychiatric care     History of  psychiatric hospitalization     about a year ago in [place]    Human immunodeficiency virus (HIV) disease     Obsessive-compulsive disorder     Psychiatric exam requested by authority     Psychiatric problem     Psychosis     PTSD (post-traumatic stress disorder)     Seizures     Suicide attempt     2008 by [facility]       Past Surgical History:   Procedure Laterality Date    hemorrhoidectomy  2013       Social History     Tobacco Use    Smoking status: Every Day     Current packs/day: 1.50     Average packs/day: 1.5 packs/day for 10.0 years (15.0 ttl pk-yrs)     Types: Cigarettes     Passive exposure: Current    Smokeless tobacco: Never   Substance Use Topics    Alcohol use: No    Drug use: No       Family History   Problem Relation Name Age of Onset    Anxiety disorder Mother Hemalatha Labit     Seizures Mother Hemalatha Labit     Heart attack Mother Hemalatha Labit     Early death Mother Hemalatha Labit     Cancer Father Laurent Marcos sr     Hyperlipidemia Father Laurent Marcos sr     Hypertension Father Laurent Marcos sr     Diabetes Father Laurent Marcos sr     Bipolar disorder Father Laurent Marcos sr     Depression Father Laurent Marcos sr     Hypertension Sister Amita Labit     Anxiety disorder Maternal Aunt          Patient Active Problem List    Diagnosis Date Noted    Uncomplicated opioid dependence 05/31/2024    Smoker 03/28/2018    Seizure disorder 08/12/2014    Depression 08/08/2014       Physical Exam:    Vitals:    08/07/24 1739   BP: 121/84   Pulse: 89   Resp: 18   Temp: 98 °F (36.7 °C)       Nursing note and vital signs reviewed.    Constitutional: No acute distress.  Nontoxic  Eyes: No conjunctival injection.  Extraocular muscles are intact.  ENT: Oropharynx clear.    Cardiovascular: Regular rate and rhythm.  No murmurs.   Respiratory: No Respiratory Distress. Good Bilateral air movement.  No rhonchi. No rales.  Abdomen: Soft.  Not distended.  Nontender.  No guarding.  No rebound. Bowel  Sounds Normal.  Musculoskeletal: Good range of motion all joints.  No Gross deformities Noted.  Skin: No Obvious Rashes seen.    Neurologic:  Awake and Alert.  Cranial Nerves Grossly intact. No focal neurological deficits.  Psychiatry:  Depressed and suicidal    Labs that have been ordered have been independently reviewed and interpreted by myself.    MEDICAL DECISION MAKING:        Reviewed Nurses Notes and Vitals.  Labs ordered AND reviewed interpreted independently.  Medical Decision Making   39 y.o. year old adult with history of  has a past medical history of ADHD (attention deficit hyperactivity disorder), Anxiety, Depression, Fatigue, History of psychiatric care, History of psychiatric hospitalization, Human immunodeficiency virus (HIV) disease, Obsessive-compulsive disorder, Psychiatric exam requested by authority, Psychiatric problem, Psychosis, PTSD (post-traumatic stress disorder), Seizures, Suicide attempt, and Therapy. presenting with Suicidal (Depressed with SI,  no plan)      Amount and/or Complexity of Data Reviewed  Labs: ordered.    Risk  Prescription drug management.       Orders Placed This Encounter   Procedures    CBC auto differential    Comprehensive metabolic panel    TSH    Urinalysis, Reflex to Urine Culture    Drug Screen panel, emergency    Ethanol    Acetaminophen level    CBC with Differential    Urinalysis, Microscopic    Diet Adult Regular    Vital signs    Undress patient and allow them to wear facility provided apparel.    Direct Psych Observation    EKG 12-lead    PEC/Psych Hold - Physicians Emergency Certificate / 72 Hour Psych Hold     Medications   prochlorperazine injection Soln 10 mg (10 mg Intramuscular Given 8/7/24 1342)     Admission on 08/07/2024   Component Date Value Ref Range Status    Sodium 08/07/2024 143  136 - 145 mmol/L Final    Potassium 08/07/2024 3.6  3.5 - 5.1 mmol/L Final    Chloride 08/07/2024 107  98 - 107 mmol/L Final    CO2 08/07/2024 28  22 - 29 mmol/L  Final    Glucose 08/07/2024 73 (L)  74 - 100 mg/dL Final    Blood Urea Nitrogen 08/07/2024 11.0  8.9 - 20.6 mg/dL Final    Creatinine 08/07/2024 0.85  0.73 - 1.18 mg/dL Final    Calcium 08/07/2024 9.3  8.4 - 10.2 mg/dL Final    Protein Total 08/07/2024 6.5  6.4 - 8.3 gm/dL Final    Albumin 08/07/2024 3.7  3.5 - 5.0 g/dL Final    Globulin 08/07/2024 2.8  2.4 - 3.5 gm/dL Final    Albumin/Globulin Ratio 08/07/2024 1.3  1.1 - 2.0 ratio Final    Bilirubin Total 08/07/2024 0.3  <=1.5 mg/dL Final    ALP 08/07/2024 57  40 - 150 unit/L Final    ALT 08/07/2024 10  0 - 55 unit/L Final    AST 08/07/2024 14  5 - 34 unit/L Final    eGFR 08/07/2024 >60  mL/min/1.73/m2 Final    Anion Gap 08/07/2024 8.0  mEq/L Final    BUN/Creatinine Ratio 08/07/2024 13   Final    TSH 08/07/2024 0.614  0.350 - 4.940 uIU/mL Final    Color, UA 08/07/2024 Yellow  Yellow, Light-Yellow, Dark Yellow, Maribel, Straw Final    Appearance, UA 08/07/2024 Clear  Clear Final    Specific Gravity, UA 08/07/2024 1.020  1.005 - 1.030 Final    pH, UA 08/07/2024 6.5  5.0 - 8.5 Final    Protein, UA 08/07/2024 Negative  Negative Final    Glucose, UA 08/07/2024 Negative  Negative, Normal Final    Ketones, UA 08/07/2024 Negative  Negative Final    Blood, UA 08/07/2024 Trace-Intact (A)  Negative Final    Bilirubin, UA 08/07/2024 Negative  Negative Final    Urobilinogen, UA 08/07/2024 0.2  0.2, 1.0, Normal Final    Nitrites, UA 08/07/2024 Negative  Negative Final    Leukocyte Esterase, UA 08/07/2024 Negative  Negative Final    Amphetamines, Urine 08/07/2024 Positive (A)  Negative Final    Barbiturates, Urine 08/07/2024 Negative  Negative Final    Benzodiazepine, Urine 08/07/2024 Positive (A)  Negative Final    Cannabinoids, Urine 08/07/2024 Negative  Negative Final    Cocaine, Urine 08/07/2024 Negative  Negative Final    Fentanyl, Urine 08/07/2024 Negative  Negative Final    MDMA, Urine 08/07/2024 Negative  Negative Final    Opiates, Urine 08/07/2024 Negative  Negative Final     Phencyclidine, Urine 08/07/2024 Negative  Negative Final    pH, Urine 08/07/2024 6.5  3.0 - 11.0 Final    Specific Gravity, Urine Auto 08/07/2024 1.020  1.001 - 1.035 Final    Ethanol Level 08/07/2024 <10.0  <=10.0 mg/dL Final    Acetaminophen Level 08/07/2024 <3.0 (L)  10.0 - 30.0 ug/ml Final    QRS Duration 08/07/2024 90  ms Preliminary    [facility] QTC Calculation 08/07/2024 439  ms Preliminary    WBC 08/07/2024 4.41 (L)  4.50 - 11.50 x10(3)/mcL Final    RBC 08/07/2024 4.67  x10(6)/mcL Final    Hgb 08/07/2024 12.9 (L)  14.0 - 18.0 g/dL Final    Hct 08/07/2024 39.0 (L)  42.0 - 52.0 % Final    MCV 08/07/2024 83.5  80.0 - 94.0 fL Final    MCH 08/07/2024 27.6  27.0 - 31.0 pg Final    MCHC 08/07/2024 33.1  33.0 - 36.0 g/dL Final    RDW 08/07/2024 13.0  11.5 - 17.0 % Final    Platelet 08/07/2024 176  130 - 400 x10(3)/mcL Final    MPV 08/07/2024 9.8  7.4 - 10.4 fL Final    Neut % 08/07/2024 51.2  % Final    Lymph % 08/07/2024 33.6  % Final    Mono % 08/07/2024 7.0  % Final    Eos % 08/07/2024 7.5  % Final    Basophil % 08/07/2024 0.5  % Final    Lymph # 08/07/2024 1.48  0.6 - 4.6 x10(3)/mcL Final    Neut # 08/07/2024 2.26  2.1 - 9.2 x10(3)/mcL Final    Mono # 08/07/2024 0.31  0.1 - 1.3 x10(3)/mcL Final    Eos # 08/07/2024 0.33  0 - 0.9 x10(3)/mcL Final    Baso # 08/07/2024 0.02  <=0.2 x10(3)/mcL Final    IG# 08/07/2024 0.01  0 - 0.04 x10(3)/mcL Final    IG% 08/07/2024 0.2  % Final    Bacteria, UA 08/07/2024 None Seen  None Seen, Rare, Occasional /HPF Final    Hyaline Casts, UA 08/07/2024 Rare  None Seen, Rare /LPF Final    Mucous, UA 08/07/2024 Small (A)  None Seen /LPF Final    Sperm, UA 08/07/2024 Few (A)  (none) /HPF Final    RBC, UA 08/07/2024 6-10 (A)  None Seen, 0-2, 3-5, 0-5 /HPF Final    WBC, UA 08/07/2024 0-5  None Seen, 0-2, 3-5, 0-5 /HPF Final    Squamous Epithelial Cells, UA 08/07/2024 None Seen  None Seen, Rare, Occasional, Occ /HPF Final     ECG Results              EKG 12-lead (Preliminary result)         Collection Time Result Time QRS Duration [facility] QTC Calculation    08/07/24 13:59:56 08/07/24 14:18:38 90 439                     Wet Read by Tamiko Rene MD (08/07/24 14:18:54, [facility] - Emergency Dept, Emergency Medicine)    EKG Initial Reading: Independently Interpreted by Tamiko Rene MD. independently as: No STEMI. Rhythm: Normal Sinus Rhythm, Rate 84. Ectopy: No Ectopy. Conduction: Normal. ST Segments: Normal ST Segments. T Waves: Normal. Axis: Normal.                          In process by Interface, Lab In [facility] (08/07/24 14:14:54)                   Narrative:    Test Reason : Z00.8,    Vent. Rate : 084 BPM     Atrial Rate : 084 BPM     P-R Int : 128 ms          QRS Dur : 090 ms      QT Int : 372 ms       P-R-T Axes : 074 083 078 degrees     QTc Int : 439 ms    Normal sinus rhythm  Normal ECG  No previous ECGs available    Referred By: [email or web address]   SELF           Confirmed By:                                   No orders to display       ED Course as of 08/07/24 1916   Wed Aug 07, 2024   1915 JEREMIAH UREÑA MD, assumed care at 1800.  Agree with above care plan and documentation.   Patient seen and examined.  He has been here for several hours while waiting for urine.  Urine was finally provided and shows positive for amphetamines and benzos at which a  was reviewed showing there was no script for amphetamines.  Previous MD has placed under a physician's Emergency certificate secondary to depression with suicidal ideation patient was now cleared for psych would benefit from inpatient psychiatric services [PL]      ED Course User Index  [PL] Giuliano Licona MD       Psych Clearance:    At the time of my examination, patient does not appear to have any major medical condition which needs to be addressed by admission to hospital and appears to be in medically optimal condition to undergo a psychiatric evaluation and treatment as needed in a psych facility, Patient will be in a  monitored facility and they can always bring back to our ER or the nearest ER for reevaluation in case develops any other symptoms.  Medically cleared for psychiatry placement: 8/7/2024  7:15 PM  Medically cleared for psychiatry placement: 8/7/2024  7:15 PM          Diagnostic Impression:      ICD-10-CM ICD-9-CM   1. Suicidal ideations  R45.851 V62.84   2. Medical clearance for psychiatric admission  Z00.8 V70.8   3. Depression with suicidal ideation  F32.A 311    R45.851 V62.84        Medication List        ASK your doctor about these medications      buprenorphine HCL 8 mg Subl  Commonly known as: SUBUTEX  Place 1 tablet (8 mg total) under the tongue 3 (three) times daily.     cyproheptadine 4 mg tablet  Commonly known as: PERIACTIN      mg tablet  Generic drug: ibuprofen     levETIRAcetam 500 MG Tab  Commonly known as: KEPPRA     OLANZapine 5 MG tablet  Commonly known as: ZyPREXA  Take 1 tablet (5 mg total) by mouth every evening.     propranoloL 10 MG tablet  Commonly known as: INDERAL             ED Disposition Condition    Transfer to Psych Facility Stable          ED Prescriptions    None       Follow-up Information    None          Medication List        ASK your doctor about these medications      buprenorphine HCL 8 mg Subl  Commonly known as: SUBUTEX  Place 1 tablet (8 mg total) under the tongue 3 (three) times daily.     cyproheptadine 4 mg tablet  Commonly known as: PERIACTIN      mg tablet  Generic drug: ibuprofen     levETIRAcetam 500 MG Tab  Commonly known as: KEPPRA     OLANZapine 5 MG tablet  Commonly known as: ZyPREXA  Take 1 tablet (5 mg total) by mouth every evening.     propranoloL 10 MG tablet  Commonly known as: INDGiuliano Mclean MD  08/07/24 1916

## 2024-08-08 VITALS
SYSTOLIC BLOOD PRESSURE: 121 MMHG | TEMPERATURE: 98 F | WEIGHT: 120 LBS | HEART RATE: 75 BPM | BODY MASS INDEX: 18.19 KG/M2 | HEIGHT: 68 IN | DIASTOLIC BLOOD PRESSURE: 82 MMHG | RESPIRATION RATE: 18 BRPM | OXYGEN SATURATION: 98 %

## 2024-08-09 LAB
OHS QRS DURATION: 90 MS
OHS QTC CALCULATION: 439 MS

## 2024-08-19 ENCOUNTER — TELEPHONE (OUTPATIENT)
Dept: FAMILY MEDICINE | Facility: CLINIC | Age: 39
End: 2024-08-19
Payer: MEDICAID

## 2024-08-19 DIAGNOSIS — F11.20 UNCOMPLICATED OPIOID DEPENDENCE: Primary | ICD-10-CM

## 2024-08-19 RX ORDER — BUPRENORPHINE HYDROCHLORIDE 8 MG/1
8 TABLET SUBLINGUAL 3 TIMES DAILY
Qty: 90 TABLET | Refills: 0 | Status: SHIPPED | OUTPATIENT
Start: 2024-08-19 | End: 2024-08-21 | Stop reason: SDUPTHER

## 2024-08-21 ENCOUNTER — PATIENT MESSAGE (OUTPATIENT)
Dept: INFECTIOUS DISEASES | Facility: CLINIC | Age: 39
End: 2024-08-21
Payer: MEDICAID

## 2024-08-21 DIAGNOSIS — F11.20 UNCOMPLICATED OPIOID DEPENDENCE: ICD-10-CM

## 2024-08-21 RX ORDER — BUPRENORPHINE HYDROCHLORIDE 8 MG/1
8 TABLET SUBLINGUAL 3 TIMES DAILY
Qty: 90 TABLET | Refills: 0 | Status: SHIPPED | OUTPATIENT
Start: 2024-08-21 | End: 2024-10-20